# Patient Record
Sex: FEMALE | Race: OTHER
[De-identification: names, ages, dates, MRNs, and addresses within clinical notes are randomized per-mention and may not be internally consistent; named-entity substitution may affect disease eponyms.]

---

## 2017-03-20 ENCOUNTER — HOSPITAL ENCOUNTER (OUTPATIENT)
Dept: HOSPITAL 63 - MAMMO | Age: 42
Discharge: HOME | End: 2017-03-20
Attending: NURSE PRACTITIONER
Payer: COMMERCIAL

## 2017-03-20 DIAGNOSIS — Z12.31: Primary | ICD-10-CM

## 2017-03-20 NOTE — RAD
DATE: 3/20/2017



EXAM: DIGITAL SCREEN BILAT W/CAD



HISTORY: Screening



COMPARISON: Digitized film screening examination from 9/25/2012



This study was interpreted with the benefit of Computerized Aided Detection

(CAD).



FINDINGS:



The breast parenchyma is primarily fatty replaced. Breast parenchyma level

density A.. 



 No mass or suspect calcifications are seen in either breast  







IMPRESSION: Benign finding







BI-RADS CATEGORY: 2 BENIGN FINDING(S)



RECOMMENDED FOLLOW-UP: 12M 12 MONTH FOLLOW-UP



PQRS compliance statement: Patient information was entered into a reminder

system with a target due date 3/20/2018 for the next mammogram.



Mammography is a sensitive method for finding small breast cancers, but it

does not detect them all and is not a substitute for careful clinical

examination.  A negative mammogram does not negate a clinically suspicious

finding and should not result in delay in biopsying a clinically suspicious

abnormality.



"Our facility is accredited by the American [employer] Mammography

Program."

## 2018-05-29 ENCOUNTER — HOSPITAL ENCOUNTER (OUTPATIENT)
Dept: HOSPITAL 63 - US | Age: 43
Discharge: HOME | End: 2018-05-29
Attending: PHYSICIAN ASSISTANT
Payer: COMMERCIAL

## 2018-05-29 DIAGNOSIS — Z12.31: ICD-10-CM

## 2018-05-29 DIAGNOSIS — R10.13: Primary | ICD-10-CM

## 2018-05-29 PROCEDURE — 76705 ECHO EXAM OF ABDOMEN: CPT

## 2018-05-29 PROCEDURE — 77067 SCR MAMMO BI INCL CAD: CPT

## 2018-05-29 NOTE — RAD
LIMITED ABDOMINAL ULTRASOUND

 

History: Intermittent epigastric pain.

 

Comparison: Ultrasound abdomen March 11, 2016.

 

Procedure: Transabdominal ultrasound images are obtained.

 

Findings:

 

Visualized pancreas is unremarkable.  

 

Liver is increased in echogenicity.  No focal hepatic masses are 

identified.  The right hepatic lobe measures 15.2 cm in length.

 

Gallbladder has an unremarkable appearance.  Common bile duct measures 

normally at 5 mm in diameter.

 

Right kidney measures 10.6 cm in length.  There is no evidence of stone or

hydronephrosis.  

 

Visualized IVC demonstrates normal caliber.

 

Impression: 

1.  Echogenic liver, compatible with fatty liver disease.

2.  Otherwise, unremarkable right upper quadrant abdominal ultrasound.

 

 

 

Electronically signed by: Albaro Knowles MD (5/29/2018 2:23 PM) [facility]

## 2018-05-30 NOTE — RAD
DATE: 5/29/2018



EXAM: DIGITAL SCREEN BILAT W/CAD



HISTORY: Routine screening



COMPARISON: 3/20/2017



This study was interpreted with the benefit of Computerized Aided Detection

(CAD).





The breast parenchyma is primarily fatty replaced. Breast parenchyma level

density A.





FINDINGS: No new or enlarging breast densities are seen.  No suspicious

microcalcifications are evident.  





IMPRESSION: Stable mammograms without evidence of malignancy.







BI-RADS CATEGORY: 1 NEGATIVE



RECOMMENDED FOLLOW-UP: 12M 12 MONTH FOLLOW-UP



PQRS compliance statement: Patient information was entered into a reminder

system with a target due date     for the next mammogram.



Mammography is a sensitive method for finding small breast cancers, but it

does not detect them all and is not a substitute for careful clinical

examination.  A negative mammogram does not negate a clinically suspicious

finding and should not result in delay in biopsying a clinically suspicious

abnormality.



"Our facility is accredited by the American [employer] Mammography

Program."

## 2019-02-21 ENCOUNTER — HOSPITAL ENCOUNTER (OUTPATIENT)
Dept: HOSPITAL 63 - PMG | Age: 44
Discharge: HOME | End: 2019-02-21
Attending: PHYSICIAN ASSISTANT
Payer: COMMERCIAL

## 2019-02-21 DIAGNOSIS — M79.621: Primary | ICD-10-CM

## 2019-02-21 DIAGNOSIS — R05: ICD-10-CM

## 2019-02-21 PROCEDURE — 71046 X-RAY EXAM CHEST 2 VIEWS: CPT

## 2019-02-21 PROCEDURE — 73060 X-RAY EXAM OF HUMERUS: CPT

## 2019-02-21 NOTE — RAD
Chest, 2 views, 2/21/2019:

 

HISTORY: Cough

 

The heart size is normal. No pulmonary infiltrate is seen. There is no 

evidence of pleural fluid.

 

IMPRESSION: No acute cardiopulmonary abnormality is detected.

 

Electronically signed by: Rick Moritz, MD (2/21/2019 9:02 AM) [facility]

## 2019-02-21 NOTE — RAD
Right humerus, 2 views, 2/21/2019:

 

HISTORY: Fall, pain

 

No fracture or bony abnormality is detected. The soft tissues are 

unremarkable.

 

IMPRESSION: No significant right humeral abnormality is identified.

 

Electronically signed by: Rick Moritz, MD (2/21/2019 9:02 AM) [facility]

## 2019-11-06 ENCOUNTER — HOSPITAL ENCOUNTER (EMERGENCY)
Dept: HOSPITAL 63 - ER | Age: 44
Discharge: HOME | End: 2019-11-06
Payer: SELF-PAY

## 2019-11-06 VITALS — DIASTOLIC BLOOD PRESSURE: 93 MMHG | SYSTOLIC BLOOD PRESSURE: 140 MMHG

## 2019-11-06 DIAGNOSIS — R11.2: ICD-10-CM

## 2019-11-06 DIAGNOSIS — K59.00: ICD-10-CM

## 2019-11-06 DIAGNOSIS — R10.84: Primary | ICD-10-CM

## 2019-11-06 LAB
ALBUMIN SERPL-MCNC: 3.7 G/DL (ref 3.4–5)
ALBUMIN/GLOB SERPL: 0.9 {RATIO} (ref 1–1.7)
ALP SERPL-CCNC: 45 U/L (ref 46–116)
ALT SERPL-CCNC: 26 U/L (ref 14–59)
ANION GAP SERPL CALC-SCNC: 8 MMOL/L (ref 6–14)
APTT PPP: YELLOW S
AST SERPL-CCNC: 18 U/L (ref 15–37)
BACTERIA #/AREA URNS HPF: (no result) /HPF
BASOPHILS # BLD AUTO: 0.1 X10^3/UL (ref 0–0.2)
BASOPHILS NFR BLD: 2 % (ref 0–3)
BILIRUB SERPL-MCNC: 0.3 MG/DL (ref 0.2–1)
BILIRUB UR QL STRIP: (no result)
BUN/CREAT SERPL: 13 (ref 6–20)
CA-I SERPL ISE-MCNC: 12 MG/DL (ref 7–20)
CALCIUM SERPL-MCNC: 8.9 MG/DL (ref 8.5–10.1)
CHLORIDE SERPL-SCNC: 104 MMOL/L (ref 98–107)
CO2 SERPL-SCNC: 28 MMOL/L (ref 21–32)
CREAT SERPL-MCNC: 0.9 MG/DL (ref 0.6–1)
EOSINOPHIL NFR BLD: 0.1 X10^3/UL (ref 0–0.7)
EOSINOPHIL NFR BLD: 1 % (ref 0–3)
ERYTHROCYTE [DISTWIDTH] IN BLOOD BY AUTOMATED COUNT: 14.2 % (ref 11.5–14.5)
FIBRINOGEN PPP-MCNC: (no result) MG/DL
GFR SERPLBLD BASED ON 1.73 SQ M-ARVRAT: 68.7 ML/MIN
GLOBULIN SER-MCNC: 3.9 G/DL (ref 2.2–3.8)
GLUCOSE SERPL-MCNC: 107 MG/DL (ref 70–99)
GLUCOSE UR STRIP-MCNC: (no result) MG/DL
HCT VFR BLD CALC: 43.1 % (ref 36–47)
HGB BLD-MCNC: 14.4 G/DL (ref 12–15.5)
LIPASE: 244 U/L (ref 73–393)
LYMPHOCYTES # BLD: 1.3 X10^3/UL (ref 1–4.8)
LYMPHOCYTES NFR BLD AUTO: 16 % (ref 24–48)
MCH RBC QN AUTO: 31 PG (ref 25–35)
MCHC RBC AUTO-ENTMCNC: 33 G/DL (ref 31–37)
MCV RBC AUTO: 94 FL (ref 79–100)
MONO #: 0.7 X10^3/UL (ref 0–1.1)
MONOCYTES NFR BLD: 8 % (ref 0–9)
NEUT #: 6.1 X10^3UL (ref 1.8–7.7)
NEUTROPHILS NFR BLD AUTO: 74 % (ref 31–73)
NITRITE UR QL STRIP: (no result)
PLATELET # BLD AUTO: 295 X10^3/UL (ref 140–400)
POTASSIUM SERPL-SCNC: 4.1 MMOL/L (ref 3.5–5.1)
PROT SERPL-MCNC: 7.6 G/DL (ref 6.4–8.2)
RBC # BLD AUTO: 4.61 X10^6/UL (ref 3.5–5.4)
RBC #/AREA URNS HPF: (no result) /HPF (ref 0–2)
SODIUM SERPL-SCNC: 140 MMOL/L (ref 136–145)
SP GR UR STRIP: 1.02
SQUAMOUS #/AREA URNS LPF: (no result) /LPF
UROBILINOGEN UR-MCNC: 0.2 MG/DL
WBC # BLD AUTO: 8.3 X10^3/UL (ref 4–11)
WBC #/AREA URNS HPF: (no result) /HPF (ref 0–4)

## 2019-11-06 PROCEDURE — 36415 COLL VENOUS BLD VENIPUNCTURE: CPT

## 2019-11-06 PROCEDURE — 85025 COMPLETE CBC W/AUTO DIFF WBC: CPT

## 2019-11-06 PROCEDURE — 80053 COMPREHEN METABOLIC PANEL: CPT

## 2019-11-06 PROCEDURE — 81025 URINE PREGNANCY TEST: CPT

## 2019-11-06 PROCEDURE — 74177 CT ABD & PELVIS W/CONTRAST: CPT

## 2019-11-06 PROCEDURE — 87086 URINE CULTURE/COLONY COUNT: CPT

## 2019-11-06 PROCEDURE — 96374 THER/PROPH/DIAG INJ IV PUSH: CPT

## 2019-11-06 PROCEDURE — 81001 URINALYSIS AUTO W/SCOPE: CPT

## 2019-11-06 PROCEDURE — 83690 ASSAY OF LIPASE: CPT

## 2019-11-06 PROCEDURE — 96361 HYDRATE IV INFUSION ADD-ON: CPT

## 2019-11-06 PROCEDURE — 99285 EMERGENCY DEPT VISIT HI MDM: CPT

## 2019-11-06 NOTE — RAD
EXAM: CT ABDOMEN/PELVIS WITH CONTRAST.

 

HISTORY: Abdominal pain.

 

TECHNIQUE: Computed tomography of the abdomen and pelvis was performed 

after the intravenous administration of 75 mL Isovue-370.

 

COMPARISON: None.

 

FINDINGS: Lung windows through the visualized portions of the bases reveal

no abnormality. Bone windows reveal no suspicious lesions. There is a 

small is noted superior endplate of L1.

 

There are multiple small calculi bilaterally in the kidneys, measuring up 

to 4 mm on the left. Small cysts at the right renal lower pole measure up 

to 5 mm. Another on the left measures 7 mm. There are no ureteral calculi.

 

Mild diffuse hepatic steatosis is suspected. The gallbladder, liver, 

adrenal glands, and spleen are unremarkable. There are no pathologically 

enlarged lymph nodes.

 

The uterus and ovaries are unremarkable by CT. The appendix is not 

inflamed. There is no small bowel obstruction. A small umbilical hernia 

contains only fat.

 

IMPRESSION: 

1. No cause for acute pain is identified.

2. Multiple small bilateral renal calculi measure up to 4 mm. No ureteral 

calculi.

3. Mild diffuse hepatic steatosis.

4. Small umbilical hernia containing only fat.

 

 

*One or more of the following individualized dose reduction techniques 

were utilized for this examination:  

1. Automated exposure control.  

2. Adjustment of the mA and/or kV according to patient size.  

3. Use of iterative reconstruction technique.

 

Electronically signed by: ROSITA Adamson MD (11/6/2019 9:23 AM) [facility]

## 2019-11-06 NOTE — PHYS DOC
Past History


Past Medical History:  No Pertinent History


Past Surgical History:  No Surgical History


Alcohol Use:  None


Drug Use:  None





Adult General


Chief Complaint


Chief Complaint:  ABDOMINAL PAIN





HPI


HPI


Patient is a 44-year-old female who presents to the emergency department for 

evaluation. She states she has had generalized abdominal pain, numerous episodes

of vomiting, on and off for the past few months. She has not had any fevers or 

chills. She has had intermittent episodes of constipation and normal bowel 

movements, but has not had any bloody emesis or bloody stools. She denies any 

urinary symptoms, pelvic pain, vaginal bleeding, or discharge. She has not had 

similar symptoms in the past, although she has had at least 1 prior gallbladder 

ultrasound, about a year and a half ago, at this facility, which did not show 

any significant abnormality of her gallbladder. There are no alleviating or 

exacerbating factors to the patient's symptoms.





Review of Systems


Review of Systems





Constitutional: Denies fever or chills []


Eyes: Denies change in visual acuity, redness, or eye pain []


HENT: Denies nasal congestion or sore throat []


Respiratory: Denies cough or shortness of breath []


Cardiovascular:The patient denies any shortness of breath, chest pain, 

palpitations, or orthopnea []


GI:  No additional information not addressed in HPI []


: Denies dysuria or hematuria []


Musculoskeletal: Denies back pain or joint pain []


Integument: Denies rash or skin lesions []


Neurologic: Denies headache, focal weakness or sensory changes []


Endocrine: Denies polyuria or polydipsia []





All other systems were reviewed and found to be within normal limits, except as 

documented in this note.





Physical Exam


Physical Exam


PHYSICAL EXAM:





CONSTITUTIONAL: Well developed, well nourished


HEAD: normocephalic, atraumatic


EENT: PERRL, EOMI. Conjunctivae normal color, sclerae non-icteric; moist mucous 

membranes.


NECK: Supple, non-tender; no meningismus.


LUNGS: Lungs CTA, breathing even and unlabored. Normal air movement.


HEART: Regular rate and rhythm, no murmur


CHEST: No deformity; non-tender


ABDOMEN: The abdomen is soft, there is mild diffuse tenderness to palpation to 

the mid and lower abdomen, without focal tenderness, rebound, or guarding, the 

right upper quadrant, as well as a suprapubic pubic area, are both relatively 

non-tender, no masses or bruits.


EXTREM: Normal ROM; no deformity, no calf tenderness. Normal pulses palpable in 

all extremities. There is no pedal edema.


SKIN: No rash; no diaphoresis


NEURO: Alert; normal speech and cognition; CN's grossly intact; strength grossly

 intact without focal deficit.


BACK: No CVA TTP.





Current Patient Data


Vital Signs





                                   Vital Signs








  Date Time  Temp Pulse Resp B/P (MAP) Pulse Ox O2 Delivery O2 Flow Rate FiO2


 


11/6/19 07:41 98.2 72 16  96 Room Air  








Lab Results





Laboratory Tests








Test


 11/6/19


07:45 11/6/19


07:48 11/6/19


07:53


 


White Blood Count 8.3 x10^3/uL   


 


Red Blood Count 4.61 x10^6/uL   


 


Hemoglobin 14.4 g/dL   


 


Hematocrit 43.1 %   


 


Mean Corpuscular Volume 94 fL   


 


Mean Corpuscular Hemoglobin 31 pg   


 


Mean Corpuscular Hemoglobin


Concent 33 g/dL 


 


 





 


Red Cell Distribution Width 14.2 %   


 


Platelet Count 295 x10^3/uL   


 


Neutrophils (%) (Auto) 74 %   


 


Lymphocytes (%) (Auto) 16 %   


 


Monocytes (%) (Auto) 8 %   


 


Eosinophils (%) (Auto) 1 %   


 


Basophils (%) (Auto) 2 %   


 


Neutrophils # (Auto) 6.1 x10^3uL   


 


Lymphocytes # (Auto) 1.3 x10^3/uL   


 


Monocytes # (Auto) 0.7 x10^3/uL   


 


Eosinophils # (Auto) 0.1 x10^3/uL   


 


Basophils # (Auto) 0.1 x10^3/uL   


 


Sodium Level 140 mmol/L   


 


Potassium Level 4.1 mmol/L   


 


Chloride Level 104 mmol/L   


 


Carbon Dioxide Level 28 mmol/L   


 


Anion Gap 8   


 


Blood Urea Nitrogen 12 mg/dL   


 


Creatinine 0.9 mg/dL   


 


Estimated GFR


(Cockcroft-Gault) 68.7 


 


 





 


BUN/Creatinine Ratio 13   


 


Glucose Level 107 mg/dL   


 


Calcium Level 8.9 mg/dL   


 


Total Bilirubin 0.3 mg/dL   


 


Aspartate Amino Transf


(AST/SGOT) 18 U/L 


 


 





 


Alanine Aminotransferase


(ALT/SGPT) 26 U/L 


 


 





 


Alkaline Phosphatase 45 U/L   


 


Total Protein 7.6 g/dL   


 


Albumin 3.7 g/dL   


 


Albumin/Globulin Ratio 0.9   


 


Lipase 244 U/L   


 


Urine Collection Type  Unknown  


 


Urine Color  Yellow  


 


Urine Clarity  Cloudy  


 


Urine pH  7.0  


 


Urine Specific Gravity  1.020  


 


Urine Protein  Neg  


 


Urine Glucose (UA)  Neg mg/dL  


 


Urine Ketones (Stick)  Neg mg/dL  


 


Urine Blood  Trace  


 


Urine Nitrite  Neg  


 


Urine Bilirubin  Neg  


 


Urine Urobilinogen Dipstick  0.2 mg/dL  


 


Urine Leukocyte Esterase  Neg  


 


Urine RBC  3-5 /HPF  


 


Urine WBC  1-4 /HPF  


 


Urine Squamous Epithelial


Cells 


 Many /LPF 


 





 


Urine Bacteria  Mod /HPF  


 


Urine Mucus  Slight /LPF  


 


Bedside Urine HCG, Qualitative   hcg negative 








Current Medications








 Medications


  (Trade)  Dose


 Ordered  Sig/Lala


 Route


 PRN Reason  Start Time


 Stop Time Status Last Admin


Dose Admin


 


 Sodium Chloride  1,000 ml @ 


 1,000 mls/hr  Q1H


 IV


   11/6/19 07:43


 11/6/19 08:42  11/6/19 08:04





 


 Ondansetron HCl


  (Zofran)  4 mg  1X  ONCE


 IVP


   11/6/19 08:10


 11/6/19 08:11 DC 11/6/19 08:04





 


 Acetaminophen


  (Tylenol)  1,000 mg  1X  ONCE


 PO


   11/6/19 07:45


 11/6/19 08:02 DC 11/6/19 08:04





 


 Iohexol


  (Omnipaque 300


 Mg/ml)  75 ml  1X  ONCE


 IV


   11/6/19 08:15


 11/6/19 08:16 DC 11/6/19 08:08














EKG


EKG


[]





Radiology/Procedures


Radiology/Procedures


PROCEDURE: CT ABD PELV W/ IV CONTRST ONLY





EXAM: CT ABDOMEN/PELVIS WITH CONTRAST.


 


HISTORY: Abdominal pain.


 


TECHNIQUE: Computed tomography of the abdomen and pelvis was performed 


after the intravenous administration of 75 mL Isovue-370.


 


COMPARISON: None.


 


FINDINGS: Lung windows through the visualized portions of the bases reveal


no abnormality. Bone windows reveal no suspicious lesions. There is a 


small is noted superior endplate of L1.


 


There are multiple small calculi bilaterally in the kidneys, measuring up 


to 4 mm on the left. Small cysts at the right renal lower pole measure up 


to 5 mm. Another on the left measures 7 mm. There are no ureteral calculi.


 


Mild diffuse hepatic steatosis is suspected. The gallbladder, liver, 


adrenal glands, and spleen are unremarkable. There are no pathologically 


enlarged lymph nodes.


 


The uterus and ovaries are unremarkable by CT. The appendix is not 


inflamed. There is no small bowel obstruction. A small umbilical hernia 


contains only fat.


 


IMPRESSION: 


1. No cause for acute pain is identified.


2. Multiple small bilateral renal calculi measure up to 4 mm. No ureteral 


calculi.


3. Mild diffuse hepatic steatosis.


4. Small umbilical hernia containing only fat.[]





Course & Med Decision Making


Course & Med Decision Making


Pertinent Labs and Imaging studies reviewed. (See chart for details)





[]Discussed test results with the patient, the need for further outpatient 

evaluation and GI follow-up, and return precautions in detail.





Dragon Disclaimer


Dragon Disclaimer


This electronic medical record was generated, in whole or in part, using a voice

 recognition dictation system.





Departure


Departure:


Impression:  


   Primary Impression:  


   Abdominal pain


   Additional Impression:  


   Nausea & vomiting


Disposition:  01 HOME, SELF-CARE


Condition:  STABLE


Referrals:  


SHELTON GARRETT (PCP)


Patient Instructions:  Abdominal Pain (Nonspecific), Nausea and Vomiting


Scripts


Ondansetron (ONDANSETRON ODT) 4 Mg Tab.rapdis


1 TAB PO PRN Q6-8HRS PRN for NAUSEA, #15 TAB


   Prov: KEVIN ROLLINS MD         11/6/19





Problem Qualifiers











KEVIN ROLLINS MD            Nov 6, 2019 07:48

## 2020-07-13 ENCOUNTER — HOSPITAL ENCOUNTER (EMERGENCY)
Dept: HOSPITAL 63 - ER | Age: 45
Discharge: HOME | End: 2020-07-13
Payer: SELF-PAY

## 2020-07-13 VITALS — HEIGHT: 65 IN | BODY MASS INDEX: 29.2 KG/M2 | WEIGHT: 175.27 LBS

## 2020-07-13 VITALS
SYSTOLIC BLOOD PRESSURE: 140 MMHG | SYSTOLIC BLOOD PRESSURE: 140 MMHG | DIASTOLIC BLOOD PRESSURE: 81 MMHG | SYSTOLIC BLOOD PRESSURE: 140 MMHG | DIASTOLIC BLOOD PRESSURE: 81 MMHG | DIASTOLIC BLOOD PRESSURE: 81 MMHG

## 2020-07-13 DIAGNOSIS — R42: ICD-10-CM

## 2020-07-13 DIAGNOSIS — R11.2: ICD-10-CM

## 2020-07-13 DIAGNOSIS — G44.201: Primary | ICD-10-CM

## 2020-07-13 DIAGNOSIS — Z79.899: ICD-10-CM

## 2020-07-13 LAB
ALBUMIN SERPL-MCNC: 3.6 G/DL (ref 3.4–5)
ALBUMIN/GLOB SERPL: 0.9 {RATIO} (ref 1–1.7)
ALP SERPL-CCNC: 48 U/L (ref 46–116)
ALT SERPL-CCNC: 30 U/L (ref 14–59)
ANION GAP SERPL CALC-SCNC: 7 MMOL/L (ref 6–14)
AST SERPL-CCNC: 18 U/L (ref 15–37)
BASOPHILS # BLD AUTO: 0.1 X10^3/UL (ref 0–0.2)
BASOPHILS NFR BLD: 1 % (ref 0–3)
BILIRUB SERPL-MCNC: 0.3 MG/DL (ref 0.2–1)
BUN/CREAT SERPL: 13 (ref 6–20)
CA-I SERPL ISE-MCNC: 17 MG/DL (ref 7–20)
CALCIUM SERPL-MCNC: 8.8 MG/DL (ref 8.5–10.1)
CHLORIDE SERPL-SCNC: 103 MMOL/L (ref 98–107)
CO2 SERPL-SCNC: 29 MMOL/L (ref 21–32)
CREAT SERPL-MCNC: 1.3 MG/DL (ref 0.6–1)
CRP SERPL-MCNC: 4.1 MG/L (ref 0–3.3)
EOSINOPHIL NFR BLD: 0.1 X10^3/UL (ref 0–0.7)
EOSINOPHIL NFR BLD: 1 % (ref 0–3)
ERYTHROCYTE [DISTWIDTH] IN BLOOD BY AUTOMATED COUNT: 14.1 % (ref 11.5–14.5)
GFR SERPLBLD BASED ON 1.73 SQ M-ARVRAT: 44.7 ML/MIN
GLOBULIN SER-MCNC: 4 G/DL (ref 2.2–3.8)
GLUCOSE SERPL-MCNC: 87 MG/DL (ref 70–99)
HCT VFR BLD CALC: 42.7 % (ref 36–47)
HGB BLD-MCNC: 14.4 G/DL (ref 12–15.5)
LYMPHOCYTES # BLD: 1.8 X10^3/UL (ref 1–4.8)
LYMPHOCYTES NFR BLD AUTO: 22 % (ref 24–48)
MCH RBC QN AUTO: 31 PG (ref 25–35)
MCHC RBC AUTO-ENTMCNC: 34 G/DL (ref 31–37)
MCV RBC AUTO: 93 FL (ref 79–100)
MONO #: 0.6 X10^3/UL (ref 0–1.1)
MONOCYTES NFR BLD: 8 % (ref 0–9)
NEUT #: 5.5 X10^3UL (ref 1.8–7.7)
NEUTROPHILS NFR BLD AUTO: 67 % (ref 31–73)
PLATELET # BLD AUTO: 300 X10^3/UL (ref 140–400)
POTASSIUM SERPL-SCNC: 3.8 MMOL/L (ref 3.5–5.1)
PROT SERPL-MCNC: 7.6 G/DL (ref 6.4–8.2)
RBC # BLD AUTO: 4.61 X10^6/UL (ref 3.5–5.4)
SODIUM SERPL-SCNC: 139 MMOL/L (ref 136–145)
WBC # BLD AUTO: 8.1 X10^3/UL (ref 4–11)

## 2020-07-13 PROCEDURE — 80053 COMPREHEN METABOLIC PANEL: CPT

## 2020-07-13 PROCEDURE — 96374 THER/PROPH/DIAG INJ IV PUSH: CPT

## 2020-07-13 PROCEDURE — 96361 HYDRATE IV INFUSION ADD-ON: CPT

## 2020-07-13 PROCEDURE — 96375 TX/PRO/DX INJ NEW DRUG ADDON: CPT

## 2020-07-13 PROCEDURE — 86140 C-REACTIVE PROTEIN: CPT

## 2020-07-13 PROCEDURE — 85025 COMPLETE CBC W/AUTO DIFF WBC: CPT

## 2020-07-13 PROCEDURE — 99285 EMERGENCY DEPT VISIT HI MDM: CPT

## 2020-07-13 PROCEDURE — 70450 CT HEAD/BRAIN W/O DYE: CPT

## 2020-07-13 PROCEDURE — 36415 COLL VENOUS BLD VENIPUNCTURE: CPT

## 2020-07-13 NOTE — RAD
CT HEAD WO CONTRAST

 

History:Right-sided headache

 

Comparison: None.

 

Technique: Noncontrast CT imaging was performed of the head.  Exposure: 

One or more of the following individualized dose reduction techniques were

utilized for this examination:  1. Automated exposure control  2. 

Adjustment of the mA and/or kV according to patient size  3. Use of 

iterative reconstruction technique.

 

Findings: No acute extra-axial or parenchymal hemorrhage is identified. 

There is no significant intra-axial mass effect, midline shift, or 

extra-axial fluid collection. The gray-white differentiation of the major 

vascular territories is preserved.  The ventricles, sulci, and cisterns 

are within normal limits in size and configuration.   The mastoid air 

cells and the visualized paranasal sinuses are aerated.  No acute 

calvarial abnormality is identified.

 

Impression:

1. No acute intracranial abnormality is identified.  

 

Electronically signed by: Ryley Villavicencio MD (7/13/2020 2:07 PM) [id number]

## 2020-07-13 NOTE — PHYS DOC
Past History


Past Medical History:  No Pertinent History


Past Surgical History:  No Surgical History


Smoking:  Non-smoker


Alcohol Use:  None


Drug Use:  None





General Adult


EDM:


Chief Complaint:  HEADACHE





HPI:


HPI:





Patient is a 45 year old female who presents for evaluation of headache that has

been progressing for 3 days.  It is worse on the right side of her head.  

Patient denies any vision changes, although she does have light sensitivity.  

She has a history of recurring headaches but they are not usually this bad.  

Patient had an episode of vomiting today.  Patient denies any neck stiffness.  

Patient has a prior history of seizures in the distant past at age 15.  She has 

not had a recent seizure and she is not on seizure medication.  Patient is 

benign and nontoxic appearing.  Patient has no focal deficits or lateralizing 

signs





Review of Systems:


Review of Systems:


Constitutional:  Denies fever or chills 


Eyes:  Denies change in visual acuity 


HENT:  Denies nasal congestion or sore throat 


Respiratory:  Denies cough or shortness of breath 


Cardiovascular:  Denies chest pain or edema 


GI:  Denies abdominal pain, had nausea and vomiting, no bloody stools or 

diarrhea 


: Denies dysuria 


Musculoskeletal:  Denies back pain or joint pain 


Integument:  Denies rash 


Neurologic:  has headache, no focal weakness or sensory changes 


Endocrine:  Denies polyuria or polydipsia 


Lymphatic:  Denies swollen glands 


Psychiatric:  Denies depression or anxiety





Heart Score:


Risk Factors:


Risk Factors:  DM, Current or recent (<one month) smoker, HTN, HLP, family 

history of CAD, obesity.


Risk Scores:


Score 0 - 3:  2.5% MACE over next 6 weeks - Discharge Home


Score 4 - 6:  20.3% MACE over next 6 weeks - Admit for Clinical Observation


Score 7 - 10:  72.7% MACE over next 6 weeks - Early Invasive Strategies





Current Medications:


Current Meds:





Current Medications








 Medications


  (Trade)  Dose


 Ordered  Sig/Lala  Start Time


 Stop Time Status Last Admin


Dose Admin


 


 Diphenhydramine


 HCl


  (Benadryl)  25 mg  1X  ONCE  20 13:30


 20 13:32 DC 20 13:56


25 MG


 


 Ondansetron HCl


  (Zofran)  4 mg  1X  ONCE  20 13:30


 20 13:32 DC 20 13:56


4 MG


 


 Sodium Chloride  1,000 ml @ 


 1,000 mls/hr  1X  ONCE  20 13:30


 20 14:29  20 13:55


1,000 MLS/HR











Allergies:


Allergies:





Allergies








Coded Allergies Type Severity Reaction Last Updated Verified


 


  No Known Drug Allergies    19 No











Physical Exam:


PE:





Constitutional: Well developed, well nourished, mild acute distress, non-toxic 

appearance. []


HENT: Normocephalic, atraumatic, bilateral external ears normal, oropharynx 

moist, no oral exudates, nose normal. []


Eyes: PERRL, EOMI, conjunctiva normal, no discharge. [] 


Neck: Normal range of motion, no tenderness, supple, no stridor. [] 


Cardiovascular:Heart rate regular rhythm, no murmur []


Lungs & Thorax:  Bilateral breath sounds clear to auscultation []


Abdomen: Bowel sounds normal, soft, no tenderness, no masses. [] 


Skin: Warm, dry, no erythema, no rash. [] 


Back: No tenderness. [] 


Extremities: No tenderness, no cyanosis, ROM intact, no edema. [] 


Neurologic: Alert and oriented X 3, normal motor function, normal sensory 

function, no focal deficits noted. []


Psychologic: Affect normal, judgement normal, mood normal. []





Current Patient Data:


Labs:





Laboratory Tests








Test


 20


13:39


 


White Blood Count 8.1 x10^3/uL 


 


Red Blood Count 4.61 x10^6/uL 


 


Hemoglobin 14.4 g/dL 


 


Hematocrit 42.7 % 


 


Mean Corpuscular Volume 93 fL 


 


Mean Corpuscular Hemoglobin 31 pg 


 


Mean Corpuscular Hemoglobin


Concent 34 g/dL 





 


Red Cell Distribution Width 14.1 % 


 


Platelet Count 300 x10^3/uL 


 


Neutrophils (%) (Auto) 67 % 


 


Lymphocytes (%) (Auto) 22 % 


 


Monocytes (%) (Auto) 8 % 


 


Eosinophils (%) (Auto) 1 % 


 


Basophils (%) (Auto) 1 % 


 


Neutrophils # (Auto) 5.5 x10^3uL 


 


Lymphocytes # (Auto) 1.8 x10^3/uL 


 


Monocytes # (Auto) 0.6 x10^3/uL 


 


Eosinophils # (Auto) 0.1 x10^3/uL 


 


Basophils # (Auto) 0.1 x10^3/uL 


 


Sodium Level 139 mmol/L 


 


Potassium Level 3.8 mmol/L 


 


Chloride Level 103 mmol/L 


 


Carbon Dioxide Level 29 mmol/L 


 


Anion Gap 7 


 


Blood Urea Nitrogen 17 mg/dL 


 


Creatinine 1.3 mg/dL 


 


Estimated GFR


(Cockcroft-Gault) 44.7 





 


BUN/Creatinine Ratio 13 


 


Glucose Level 87 mg/dL 


 


Calcium Level 8.8 mg/dL 


 


Total Bilirubin 0.3 mg/dL 


 


Aspartate Amino Transf


(AST/SGOT) 18 U/L 





 


Alanine Aminotransferase


(ALT/SGPT) 30 U/L 





 


Alkaline Phosphatase 48 U/L 


 


C-Reactive Protein 4.1 mg/L 


 


Total Protein 7.6 g/dL 


 


Albumin 3.6 g/dL 


 


Albumin/Globulin Ratio 0.9 








Current Medications








 Medications


  (Trade)  Dose


 Ordered  Sig/Lala


 Route


 PRN Reason  Start Time


 Stop Time Status Last Admin


Dose Admin


 


 Sodium Chloride  1,000 ml @ 


 1,000 mls/hr  1X  ONCE


 IV


   20 13:30


 20 14:29 DC 20 13:55





 


 Ondansetron HCl


  (Zofran)  4 mg  1X  ONCE


 IVP


   20 13:30


 20 13:32 DC 20 13:56





 


 Diphenhydramine


 HCl


  (Benadryl)  25 mg  1X  ONCE


 IVP


   20 13:30


 20 13:32 DC 20 13:56





 


 Ketorolac


 Tromethamine


  (Toradol 15mg


 Vial)  15 mg  1X  ONCE


 IVP


   20 14:30


 20 14:31 DC 20 14:25











Vital Signs:





                                   Vital Signs








  Date Time  Temp Pulse Resp B/P (MAP) Pulse Ox O2 Delivery O2 Flow Rate FiO2


 


20 13:08 97.7 95 16 133/71 (91) 95 Room Air  











EKG:


EKG:


[]





Radiology/Procedures:


Radiology/Procedures:


[facility] [address], [place] [address] [phone]


                                        


                                 IMAGING REPORT





                                     Signed





PATIENT: COOPER ASTORGA DACCOUNT: [id number]     MRN#: [id number]


: 1975           LOCATION: ER              AGE: 45


SEX: F                    EXAM DT: 20         ACCESSION#: [id number]


STATUS: PRE ER            ORD. PHYSICIAN: MATT KOLB DO


REASON: right sided headache


PROCEDURE: CT HEAD WO CONTRAST





CT HEAD WO CONTRAST


 


History:Right-sided headache


 


Comparison: None.


 


Technique: Noncontrast CT imaging was performed of the head.  Exposure: 


One or more of the following individualized dose reduction techniques were


utilized for this examination:  1. Automated exposure control  2. 


Adjustment of the mA and/or kV according to patient size  3. Use of 


iterative reconstruction technique.


 


Findings: No acute extra-axial or parenchymal hemorrhage is identified. 


There is no significant intra-axial mass effect, midline shift, or 


extra-axial fluid collection. The gray-white differentiation of the major 


vascular territories is preserved.  The ventricles, sulci, and cisterns 


are within normal limits in size and configuration.   The mastoid air 


cells and the visualized paranasal sinuses are aerated.  No acute 


calvarial abnormality is identified.


 


Impression:


1. No acute intracranial abnormality is identified.  


 


Electronically signed by: Danielito Chauhan MD (2020 2:07 PM) [id number]














DICTATED AND SIGNED BY:     DANIELITO CHAUHAN MD


DATE:     20 1409





CC: SHELTON GARRETT; MATT KOLB DO ~


[]





Course & Med Decision Making:


Course & Med Decision Making


Pertinent Labs and Imaging studies reviewed. (See chart for details)





[]





Dragon Disclaimer:


Dragon Disclaimer:


This electronic medical record was generated, in whole or in part, using a voice

 recognition dictation system.





1450 stable, feeling much better at this time.  Patient is clear this is not the

 worst headache of her life.  She currently rates her pain as 3 out of 10.  

Prescription for Phenergan given.  Patient has no focal deficits or lateralizing

 signs.  She does have a steady gait.  CT head and blood work were unremarkable 

with exception of a slightly elevated CRP.  We are not able to run a sed rate at

 this time.





Departure


Departure:


Impression:  


   Primary Impression:  


   Acute headache


   Qualified Codes:  G44.201 - Tension-type headache, unspecified, intractable


   Additional Impression:  


   Nausea & vomiting


Disposition:  01 HOME/RESIDENCE PRIOR TO ADM


Condition:  STABLE


Referrals:  


SHELTON GARRETT (PCP)


Patient Instructions:  General Headache Without Cause, Nausea and Vomiting





Additional Instructions:  


Drink plenty fluids, rest, take medication as directed, no dangerous cause of 

your headache found.  Your CAT scan of head and blood work unremarkable


Scripts


Promethazine Hcl (PROMETHAZINE HCL) 25 Mg Tablet


1 TAB PO PRN Q6HRS for nausea and vomiting, #20 TAB


   Prov: MATT KOLB DO         20





Justification of Admission:


Justification of Admission:


Justification of Admission Dx:  N/A











MATT KOLB DO              2020 14:17

## 2020-08-28 ENCOUNTER — HOSPITAL ENCOUNTER (OUTPATIENT)
Dept: HOSPITAL 63 - US | Age: 45
Discharge: HOME | End: 2020-08-28
Attending: PHYSICIAN ASSISTANT
Payer: COMMERCIAL

## 2020-08-28 DIAGNOSIS — K76.0: Primary | ICD-10-CM

## 2020-08-28 DIAGNOSIS — N20.0: ICD-10-CM

## 2020-08-28 DIAGNOSIS — K76.89: ICD-10-CM

## 2020-08-28 PROCEDURE — 76705 ECHO EXAM OF ABDOMEN: CPT

## 2020-08-28 NOTE — RAD
INDICATION : Reason: RUQ ABD PAIN, NAUSEA / Spl. Instructions:  / History:

 

 

COMPARISON: November 2019

 

TECHNIQUE: Multiple ultrasound images obtained through the abdomen in 

grayscale and color.

 

FINDINGS:

 

Liver: Echogenic. Portions of liver not well seen secondary to overlying 

structures obscuring.

Gallbladder: No wall thickening or stones.

IVC: Partially distended at level of liver.

Common Bile Duct: Not dilated.

Pancreas: No gross abnormality identified in visualized portions of 

pancreas.

Right Kidney:  No hydronephrosis. There are some echogenic foci within 

measuring up to 4 mm.

 

IMPRESSION:

 

*   Liver is echogenic. Nonspecific but can be seen with fatty 

infiltration.

 

*  There are some echogenic foci at the right kidney. Causes such as 

nonobstructive renal stone could have this appearance.

 

Electronically signed by: Chepe Fung MD (8/28/2020 8:39 AM) [id number]

## 2020-12-07 ENCOUNTER — HOSPITAL ENCOUNTER (OUTPATIENT)
Dept: HOSPITAL 63 - NM | Age: 45
End: 2020-12-07
Payer: COMMERCIAL

## 2020-12-07 VITALS — WEIGHT: 170 LBS | BODY MASS INDEX: 28.32 KG/M2 | HEIGHT: 65 IN

## 2020-12-07 DIAGNOSIS — K81.1: Primary | ICD-10-CM

## 2020-12-07 PROCEDURE — 78227 HEPATOBIL SYST IMAGE W/DRUG: CPT

## 2020-12-07 PROCEDURE — A9537 TC99M MEBROFENIN: HCPCS

## 2020-12-07 NOTE — RAD
Examination: NM HEPATOBILIARY SCAN W PHARM

 

History: Reason: right upper quandrant pain, nausea and vomiting for 2 

years / Spl. Instructions:  / History: 

 

Comparison/Correlation: 8/28/2020 Limited abdominal ultrasound

 

Findings: 5 mCi technetium 99m Choletec was intravenously administered for

purposes of thyroid ablation therapy. Relatively greater intensity of 

radiotracer involving the left hepatic lobe distribution is present but 

this is probably artifactual and related to attenuation by soft tissues of

the right hepatic dome region. No biliary dilatation. Gallbladder is 

visualized at 35 minutes. Radiotracer is present within small bowel at 15 

to 20 minutes.

 

After 60 minutes, 1.54 mcg CCK was administered over the course of 30 

minutes and imaging was performed for an additional 60 minutes. 

Gallbladder ejection fraction of 1 percent is identified.

 

 

Impression:

Very low gallbladder ejection fraction compatible with chronic 

cholecystitis in the appropriate clinical setting. No evidence of acute 

cholecystitis or biliary dilatation.

 

Electronically signed by: Angel Rodriguez MD (12/7/2020 10:51 AM) [id number]

## 2021-02-16 ENCOUNTER — HOSPITAL ENCOUNTER (OUTPATIENT)
Dept: HOSPITAL 61 - LAB | Age: 46
End: 2021-02-16
Attending: SURGERY
Payer: COMMERCIAL

## 2021-02-16 DIAGNOSIS — Z20.822: ICD-10-CM

## 2021-02-16 DIAGNOSIS — Z01.812: Primary | ICD-10-CM

## 2021-02-16 DIAGNOSIS — K82.8: ICD-10-CM

## 2021-02-16 PROCEDURE — U0003 INFECTIOUS AGENT DETECTION BY NUCLEIC ACID (DNA OR RNA); SEVERE ACUTE RESPIRATORY SYNDROME CORONAVIRUS 2 (SARS-COV-2) (CORONAVIRUS DISEASE [COVID-19]), AMPLIFIED PROBE TECHNIQUE, MAKING USE OF HIGH THROUGHPUT TECHNOLOGIES AS DESCRIBED BY CMS-2020-01-R: HCPCS

## 2021-02-19 ENCOUNTER — HOSPITAL ENCOUNTER (OUTPATIENT)
Dept: HOSPITAL 61 - SURG | Age: 46
Discharge: HOME | End: 2021-02-19
Attending: SURGERY
Payer: COMMERCIAL

## 2021-02-19 VITALS — WEIGHT: 220 LBS | HEIGHT: 65 IN | BODY MASS INDEX: 36.65 KG/M2

## 2021-02-19 VITALS — SYSTOLIC BLOOD PRESSURE: 133 MMHG | DIASTOLIC BLOOD PRESSURE: 72 MMHG

## 2021-02-19 DIAGNOSIS — K82.8: Primary | ICD-10-CM

## 2021-02-19 DIAGNOSIS — Z98.890: ICD-10-CM

## 2021-02-19 DIAGNOSIS — Z79.899: ICD-10-CM

## 2021-02-19 DIAGNOSIS — K81.1: ICD-10-CM

## 2021-02-19 DIAGNOSIS — E66.9: ICD-10-CM

## 2021-02-19 DIAGNOSIS — J45.909: ICD-10-CM

## 2021-02-19 PROCEDURE — 81025 URINE PREGNANCY TEST: CPT

## 2021-02-19 PROCEDURE — 47562 LAPAROSCOPIC CHOLECYSTECTOMY: CPT

## 2021-02-19 RX ADMIN — FENTANYL CITRATE PRN MCG: 50 INJECTION INTRAMUSCULAR; INTRAVENOUS at 09:00

## 2021-02-19 RX ADMIN — FENTANYL CITRATE PRN MCG: 50 INJECTION INTRAMUSCULAR; INTRAVENOUS at 09:05

## 2021-02-19 RX ADMIN — FENTANYL CITRATE PRN MCG: 50 INJECTION INTRAMUSCULAR; INTRAVENOUS at 09:35

## 2021-02-19 NOTE — PDOC4
Operative Note


Operative Note


Date: February 19 of 2021 at 834


Preoperative diagnosis: Biliary dyskinesia


Postoperative diagnosis: Same


Procedure: Laparoscopic cholecystectomy


Surgeon: Josr


Specimen: Gallbladder


Dictation: Patient is a 46-year-old female who is been complaining of right 

upper quadrant abdominal pain.  A HIDA scan showed ejection fraction of only 1% 

with recurrence of her pain.  Procedure of laparoscopic cholecystectomy was 

explained to the patient detail risk benefits were also discussed including 

bleeding infection injury to intra-abdominal contents possibly necessitating 

further or open operations.  Alternatives to this procedure also discussed with 

patient who seemed to understand and gave both verbal and written consent to 

have the procedure performed.  Patient was taken to the operating room placed in

the supine position general anesthesia was initiated once patient was sleeping 

intubated her abdomen was prepped and draped usual sterile fashion using Chl

oraPrep.  Area just below the umbilicus was injected with quarter percent 

Marcaine with epinephrine incision was made 11 blade scalpel and a varies needle

was placed within the abdomen creating pneumoperitoneum once this was complete 

the millimeter port was placed and a 5 mm camera is placed within the abdomen 

which was inspected no other abnormalities were noted.  5 mm port was placed in 

the epigastrium a 5 mm port was placed in the right midabdomen and a 5 mm port 

was placed in the right lateral abdomen.  The dome of the gallbladder is grasped

retracted cephalad the infundibulum of the gallbladder is grasped tract and 

laterally exposing the triangle adherent tissues the triangle were taken down 

exposing the cystic duct and cystic artery both were doubly clipped and 

transected the gallbladder was taken off the liver with hook electrocautery 

placed in Endo Catch bag moving the umbilicus the right upper quadrant is 

irrigated and suctioned dry hemostasis deemed be appropriate the 

pneumoperitoneum was reduced all ports removed the fascial defect at the 

umbilicus was closed with a figure-of-eight 0 Vicryl suture and the skin was 

reapproximated all port sites for subcuticular Monocryl Mastisol Steri-Strips 

and island dressings were applied.  Patient was awakened and extubated in the 

operating room taken to recovery in stable condition all sponge instrument 

needle counts listed as correct estimated blood loss 10 mL.











VALERIE HAUSER MD             Feb 19, 2021 08:37

## 2021-02-19 NOTE — PDOC1
History and Physical


Date of Admission


Date of Admission


DATE: 2/19/21 


TIME: 07:47





Identification/Chief Complaint


Chief Complaint


Right upper quadrant abdominal pain





Source


Source:  Patient





History of Present Illness


History of Present Illness


46-year-old female with several month history of right upper quadrant abdominal 

pain radiating to her back and shoulder worse after eating.  She has had some 

nausea no vomiting ultrasound did not show any gallstones but HIDA scan showed 

ejection fraction of 1%





Past Medical History


Cardiovascular:  No pertinent hx


Pulmonary:  No pertinent hx


GI:  No pertinent hx


Heme/Onc:  No pertinent hx


Hepatobiliary:  No pertinent hx


Psych:  No pertinent hx


Rheumatologic:  No pertinent hx


Infectious disease:  No pertinent hx


ENT:  No pertinent hx


Renal/:  No pertinent hx


Endocrine:  No pertinent hx


Dermatology:  No pertinent hx





Past Surgical History


Past Surgical History:  Other (D&C)





Family History


Family History:  No Significant





Social History


Smoke:  No


ALCOHOL:  none


Drugs:  None





Current Medications


Current Medications





Current Medications


Cefazolin Sodium/ Dextrose 50 ml @  100 mls/hr 1X PREOP  PRN IV PRIOR TO 

PROCEDURE;  Start 2/19/21 at 06:00;  Stop 2/19/21 at 18:00


Acetaminophen (Tylenol) 1,000 mg 1X  ONCE PO  Last administered on 2/19/21at 

06:36;  Start 2/19/21 at 06:30;  Stop 2/19/21 at 06:31;  Status DC


Ringer's Solution 1,000 ml @  100 mls/hr Q10H IV  Last administered on 2/19/21at

06:35;  Start 2/19/21 at 06:30


Fentanyl Citrate (Fentanyl 2ml Vial) 25 mcg PRN Q5MIN  PRN IVP MILD PAIN 1-3;  

Start 2/19/21 at 07:00;  Stop 2/20/21 at 06:59


Fentanyl Citrate (Fentanyl 2ml Vial) 50 mcg PRN Q5MIN  PRN IVP MODERATE PAIN 4-

6;  Start 2/19/21 at 07:00;  Stop 2/20/21 at 06:59


Morphine Sulfate (Morphine Sulfate) 1 mg PRN Q10MIN  PRN IVP SEVERE PAIN 7-10;  

Start 2/19/21 at 07:00;  Stop 2/20/21 at 06:59


Ringer's Solution 1,000 ml @  30 mls/hr Q24H IV ;  Start 2/19/21 at 07:00;  Stop

2/19/21 at 18:59


Hydromorphone HCl (Dilaudid) 0.5 mg PRN Q10MIN  PRN IVP SEVERE PAIN 7-10, 2nd 

CHOICE;  Start 2/19/21 at 07:00;  Stop 2/20/21 at 06:59


Prochlorperazine Edisylate (Compazine) 5 mg PACU PRN  PRN IVP NAUSEA, MRX1;  

Start 2/19/21 at 07:00;  Stop 2/20/21 at 06:59


Bupivacaine HCl/ Epinephrine Bitart (Sensorcain-Epi 0.25% Kit) 30 ml STK-MED 

ONCE .ROUTE ;  Start 2/19/21 at 07:19;  Stop 2/19/21 at 07:19;  Status DC


Iohexol (Omnipaque 300 Mg/ml) 50 ml STK-MED ONCE .ROUTE ;  Start 2/19/21 at 

07:19;  Stop 2/19/21 at 07:19;  Status DC


Cellulose (Surgicel Hemostat 4x8) 1 each STK-MED ONCE .ROUTE ;  Start 2/19/21 at

07:19;  Stop 2/19/21 at 07:19;  Status DC


Rocuronium Bromide (Zemuron) 50 mg STK-MED ONCE .ROUTE ;  Start 2/19/21 at 

07:32;  Stop 2/19/21 at 07:32;  Status DC


Midazolam HCl (Versed) 2 mg STK-MED ONCE .ROUTE ;  Start 2/19/21 at 07:32;  Stop

2/19/21 at 07:32;  Status DC


Fentanyl Citrate (Fentanyl 2ml Vial) 100 mcg STK-MED ONCE .ROUTE ;  Start 

2/19/21 at 07:33;  Stop 2/19/21 at 07:33;  Status DC





Active Scripts


Active


Reported


No Known Medications Prior To Admisstion (Info)  Each 1 Each MC 1X





Allergies


Allergies:  


Coded Allergies:  


     No Known Drug Allergies (Unverified , 2/19/21)





ROS


Gastrointestinal:  Yes Nausea, Yes Abdominal Pain





Physical Exam


General:  Alert, Oriented X3, Cooperative, No acute distress


HEENT:  Atraumatic, EOMI


Lungs:  Clear to auscultation, Normal air movement


Heart:  RRR, no gallops


Abdomen:  Normal bowel sounds, Soft, Other (Mildly tender right upper quadrant)


Rectal Exam:  not examined


Extremities:  No edema


Skin:  No significant lesion


Neuro:  Normal speech





Vitals


Vitals





Vital Signs








  Date Time  Temp Pulse Resp B/P (MAP) Pulse Ox O2 Delivery O2 Flow Rate FiO2


 


2/19/21 06:32 97.6 72 18  96   





 97.6       


 


2/19/21 06:29    104/63  Room Air  











Labs


Labs





Laboratory Tests








Test


 2/19/21


06:23


 


Bedside Urine HCG, Qualitative


 Hcg negative


(Negative)








Laboratory Tests








Test


 2/19/21


06:23


 


Bedside Urine HCG, Qualitative


 Hcg negative


(Negative)











VTE Prophylaxis Ordered


VTE Prophylaxis Devices:  Yes


VTE Pharmacological Prophylaxi:  Contraindicated





Assessment/Plan


Assessment/Plan


Right upper quadrant abdominal pain abnormal HIDA scan biliary dyskinesia plan 

laparoscopic cholecystectomy





Justifications for Admission


Other Justification














VALERIE HAUSER MD             Feb 19, 2021 07:50

## 2021-02-19 NOTE — DISCH
DISCHARGE INSTRUCTIONS


Condition on Discharge


Condition on Discharge:  Stable





Activity After Discharge


Activity Instructions for Disc:  Avoid exertion


Other activity instructions:  No lifting more than 20 pounds for 2 weeks





Diet after Discharge


Diet after Discharge:  Low Fat





Wound Incision Care


Other wound/incision instructi:  May shower in 24 hours





Contacting the  after DC


Call your doctor for:  If your condition worsens





Follow-Up


Follow up with:  Dr. Hauser in 2 weeks











VALERIE HAUSER MD             Feb 19, 2021 08:39

## 2021-02-22 NOTE — PATHOLOGY
[facility] Accession Number: [id number]

.                                                                01

Material submitted:                                        .

gallbladder - GALLBLADDER AND CONTENTS

.                                                                01

Clinical history:                                          .

CHOLECYSTECTOMY

.                                                                02

**********************************************************************

Diagnosis:

Gallbladder, cholecystectomy:

- Cholesterolosis, focal.

- Chronic cholecystitis.

([facility] 02/22/2021)

OLT  02/22/2021  1627 Local

**********************************************************************

.                                                                02

Comment:

There is no evidence of malignancy.

.                                                                02

Electronically signed:                                     .

Rafael Teran MD, Pathologist

NPI- [id number]

.                                                                01

Gross description:                                         .

Received in formalin labeled "Rianna Gay, gallbladder and

contents" is an intact cholecystectomy specimen measuring 7.4 x 3.6 x 3.0

cm. The serosa is tan-pink and smooth and the specimen is opened to reveal

yellow-green velvety mucosa without polyps or masses. The average wall

thickness is 0.1 cm.  A cyst is present at the fundus, measuring 0.9 cm in

greatest dimension.  Calculi are not identified.  Representative sections

of the fundus and body and the cystic duct margin are submitted in A1.

([facility]; 2/20/2021)

[facility]  02/20/2021  1119 Local

.                                                                02

Pathologist provided ICD-10:

K81.1, K82.4

.                                                                02

CPT                                                        .

[id number]

Specimen Comment: Report sent to [id number]

***Performed at:  01

   [facility]

   [address], [place]  [id number]

   MD Ben Mares MD Phone:  [phone]

***Performed at:  02

   [facility]

   [address], [place]  [id number]

   MD Rafael Teran MD Phone:  [phone]

## 2021-02-24 ENCOUNTER — HOSPITAL ENCOUNTER (EMERGENCY)
Dept: HOSPITAL 63 - ER | Age: 46
Discharge: HOME | End: 2021-02-24
Payer: COMMERCIAL

## 2021-02-24 VITALS
DIASTOLIC BLOOD PRESSURE: 88 MMHG | SYSTOLIC BLOOD PRESSURE: 116 MMHG | DIASTOLIC BLOOD PRESSURE: 88 MMHG | SYSTOLIC BLOOD PRESSURE: 116 MMHG

## 2021-02-24 VITALS — WEIGHT: 222.67 LBS | HEIGHT: 65 IN | BODY MASS INDEX: 37.1 KG/M2

## 2021-02-24 DIAGNOSIS — Z90.49: ICD-10-CM

## 2021-02-24 DIAGNOSIS — N39.0: Primary | ICD-10-CM

## 2021-02-24 LAB
ALBUMIN SERPL-MCNC: 3.2 G/DL (ref 3.4–5)
ALP SERPL-CCNC: 47 U/L (ref 46–116)
ALT SERPL-CCNC: 35 U/L (ref 14–59)
AMPHETAMINE/METHAMPHETAMINE: (no result)
ANION GAP SERPL CALC-SCNC: 6 MMOL/L (ref 6–14)
APTT PPP: YELLOW S
AST SERPL-CCNC: 17 U/L (ref 15–37)
BACTERIA #/AREA URNS HPF: (no result) /HPF
BARBITURATES UR-MCNC: (no result) UG/ML
BASOPHILS # BLD AUTO: 0.1 X10^3/UL (ref 0–0.2)
BASOPHILS NFR BLD: 1 % (ref 0–3)
BENZODIAZ UR-MCNC: (no result) UG/L
BILIRUB DIRECT SERPL-MCNC: 0.1 MG/DL (ref 0–0.2)
BILIRUB SERPL-MCNC: 0.2 MG/DL (ref 0.2–1)
BILIRUB UR QL STRIP: (no result)
CA-I SERPL ISE-MCNC: 19 MG/DL (ref 7–20)
CALCIUM SERPL-MCNC: 9.2 MG/DL (ref 8.5–10.1)
CANNABINOIDS UR-MCNC: (no result) UG/L
CHLORIDE SERPL-SCNC: 106 MMOL/L (ref 98–107)
CO2 SERPL-SCNC: 29 MMOL/L (ref 21–32)
COCAINE UR-MCNC: (no result) NG/ML
CREAT SERPL-MCNC: 1 MG/DL (ref 0.6–1)
EOSINOPHIL NFR BLD: 0.2 X10^3/UL (ref 0–0.7)
EOSINOPHIL NFR BLD: 3 % (ref 0–3)
ERYTHROCYTE [DISTWIDTH] IN BLOOD BY AUTOMATED COUNT: 14 % (ref 11.5–14.5)
FIBRINOGEN PPP-MCNC: (no result) MG/DL
GFR SERPLBLD BASED ON 1.73 SQ M-ARVRAT: 60.2 ML/MIN
GLUCOSE SERPL-MCNC: 101 MG/DL (ref 70–99)
GLUCOSE UR STRIP-MCNC: (no result) MG/DL
HCT VFR BLD CALC: 40.1 % (ref 36–47)
HGB BLD-MCNC: 13 G/DL (ref 12–15.5)
LIPASE: 208 U/L (ref 73–393)
LYMPHOCYTES # BLD: 1.5 X10^3/UL (ref 1–4.8)
LYMPHOCYTES NFR BLD AUTO: 22 % (ref 24–48)
MCH RBC QN AUTO: 30 PG (ref 25–35)
MCHC RBC AUTO-ENTMCNC: 32 G/DL (ref 31–37)
MCV RBC AUTO: 93 FL (ref 79–100)
METHADONE SERPL-MCNC: (no result) NG/ML
MONO #: 0.7 X10^3/UL (ref 0–1.1)
MONOCYTES NFR BLD: 11 % (ref 0–9)
NEUT #: 4.2 X10^3UL (ref 1.8–7.7)
NEUTROPHILS NFR BLD AUTO: 63 % (ref 31–73)
NITRITE UR QL STRIP: (no result)
OPIATES UR-MCNC: (no result) NG/ML
PCP SERPL-MCNC: (no result) MG/DL
PLATELET # BLD AUTO: 265 X10^3/UL (ref 140–400)
POTASSIUM SERPL-SCNC: 4.2 MMOL/L (ref 3.5–5.1)
PROT SERPL-MCNC: 7.2 G/DL (ref 6.4–8.2)
RBC # BLD AUTO: 4.33 X10^6/UL (ref 3.5–5.4)
RBC #/AREA URNS HPF: >40 /HPF (ref 0–2)
SODIUM SERPL-SCNC: 141 MMOL/L (ref 136–145)
SP GR UR STRIP: 1.01
SQUAMOUS #/AREA URNS LPF: (no result) /LPF
U PREG PATIENT: NEGATIVE
UROBILINOGEN UR-MCNC: 0.2 MG/DL
WBC # BLD AUTO: 6.7 X10^3/UL (ref 4–11)
WBC #/AREA URNS HPF: >40 /HPF (ref 0–4)

## 2021-02-24 PROCEDURE — 80048 BASIC METABOLIC PNL TOTAL CA: CPT

## 2021-02-24 PROCEDURE — 80307 DRUG TEST PRSMV CHEM ANLYZR: CPT

## 2021-02-24 PROCEDURE — 96374 THER/PROPH/DIAG INJ IV PUSH: CPT

## 2021-02-24 PROCEDURE — 74177 CT ABD & PELVIS W/CONTRAST: CPT

## 2021-02-24 PROCEDURE — 99285 EMERGENCY DEPT VISIT HI MDM: CPT

## 2021-02-24 PROCEDURE — 81001 URINALYSIS AUTO W/SCOPE: CPT

## 2021-02-24 PROCEDURE — 84484 ASSAY OF TROPONIN QUANT: CPT

## 2021-02-24 PROCEDURE — 82550 ASSAY OF CK (CPK): CPT

## 2021-02-24 PROCEDURE — 71045 X-RAY EXAM CHEST 1 VIEW: CPT

## 2021-02-24 PROCEDURE — 96361 HYDRATE IV INFUSION ADD-ON: CPT

## 2021-02-24 PROCEDURE — 36415 COLL VENOUS BLD VENIPUNCTURE: CPT

## 2021-02-24 PROCEDURE — 83690 ASSAY OF LIPASE: CPT

## 2021-02-24 PROCEDURE — 93005 ELECTROCARDIOGRAM TRACING: CPT

## 2021-02-24 PROCEDURE — 80076 HEPATIC FUNCTION PANEL: CPT

## 2021-02-24 PROCEDURE — 85025 COMPLETE CBC W/AUTO DIFF WBC: CPT

## 2021-02-24 PROCEDURE — 81025 URINE PREGNANCY TEST: CPT

## 2021-02-24 PROCEDURE — 87086 URINE CULTURE/COLONY COUNT: CPT

## 2021-02-24 NOTE — RAD
CT ABDOMEN+PELVIS W



History: Reason: ruq pain / Spl. Instructions:  / History: 



Comparison: CT abdomen and pelvis 11/6/2019



Technique: CT abdomen pelvis performed with intravenous contrast.



Findings:

Lung bases demonstrate an approximately 9 mm groundglass nodule left lower lobe (axial image 7). Estiven
tional areas of linear atelectasis in the visualized lungs. No pleural or pericardial effusion.



Normal hepatic contour without mass. Cholecystectomy clips. No significant biliary ductal dilatation.
 The pancreas, spleen, and adrenal glands are unremarkable. Redemonstrated are multiple small, 2-5 mm
, bilateral nephroliths. No evidence of hydronephrosis or hydroureter. Bilateral sub-5 mm renal hypod
ensities are too small to completely characterize most consistent with benign cysts.



The stomach, small bowel, appendix, and colon are unremarkable. No abdominopelvic adenopathy, free fl
uid or free air. Abdominopelvic vasculature is unremarkable. Normal uterus and adnexa. Redemonstrated
 benign pelvic phleboliths. The bladder is decompressed but otherwise without focal abnormality. Supe
rior endplate L1 vertebral body Schmorl's node. Mild disc space narrowing L5-S1. No acute or suspicio
us osseous lesions.



There is an umbilical hernia measuring 3.4 x 1.8 x 2.2 cm with approximately 1.1 x 0.9 cm neck and pr
ominent soft tissue stranding inferiorly. The fat-containing umbilical hernia appears similar to comp
arison, however infraumbilical subcutaneous fat stranding is new.



Impression: 



1.  New subcutaneous fat stranding inferior to redemonstrated fat-containing umbilical hernia may rep
resent inflammatory process such as cellulitis. Correlate with exam.

2.  Bilateral nonobstructing nephrolithiasis.

3.  Left lower lobe groundglass nodule measuring 9 mm, suspicious for infectious process. Recommend n
oncontrast CT chest at 3-6 months to evaluate for persistence.





------

Exposure: One or more of the following individualized dose reduction techniques were utilized for thi
s examination:  

1. Automated exposure control

2. Adjustment of the mA and/or kV according to patient size

3. Use of iterative reconstruction technique.



Electronically signed by: Mark Santana MD (2/24/2021 9:09 AM) [facility]

## 2021-02-24 NOTE — RAD
AP chest.



HISTORY: Right upper quadrant pain



AP view was taken of the chest. Lungs are free of infiltrates. There is no pleural effusion. Heart is
 normal in size.



IMPRESSION:

1. No acute chest disease.



Electronically signed by: Lonnie Robert MD (2/24/2021 8:40 AM) [id number]

## 2021-02-24 NOTE — EKG
[facility] [address], [place] [address]

Test Date:    2021               Test Time:    08:28:55

Pat Name:     COOPER ASTORGA       Department:   

Patient ID:   [id number]           Room:          

Gender:       F                        Technician:   MAYUR

:          1975               Requested By: SHELTON FONTENOT

Order Number: [id number]            Reading MD:     

                                 Measurements

Intervals                              Axis          

Rate:         62                       P:            36

ND:           166                      QRS:          13

QRSD:         68                       T:            16

QT:           400                                    

QTc:          408                                    

                           Interpretive Statements

SINUS RHYTHM

NORMAL ECG

RI6.02

No previous ECG available for comparison

## 2021-02-24 NOTE — PHYS DOC
Past History


Past Medical History:  No Pertinent History


Past Surgical History:  Cholecystectomy


Smoking:  Non-smoker


Alcohol Use:  None


Drug Use:  None





General Adult


EDM:


Chief Complaint:  PAIN CONTROL





HPI:


HPI:


46-year-old female with no significant past medical history presents the ED with

complaints of





Review of Systems:


Review of Systems:


Constitutional:  Denies fever or chills 


Eyes:  Denies change in visual acuity 


HENT:  Denies nasal congestion or sore throat 


Respiratory:  Denies cough or shortness of breath 


Cardiovascular:  Denies chest pain or edema 


GI:  Denies abdominal pain, nausea, vomiting, bloody stools or diarrhea 


: Denies dysuria 


Musculoskeletal:  Denies back pain or joint pain 


Integument:  Denies rash 


Neurologic:  Denies headache, focal weakness or sensory changes 


Endocrine:  Denies polyuria or polydipsia 


Lymphatic:  Denies swollen glands 


Psychiatric:  Denies depression or anxiety





Current Medications:


Current Meds:





Current Medications








 Medications


  (Trade)  Dose


 Ordered  Sig/Lala  Start Time


 Stop Time Status Last Admin


Dose Admin


 


 Iohexol


  (Omnipaque 300


 Mg/ml)  75 ml  1X  ONCE  21 08:30


 21 08:31 DC 21 08:41


75 ML


 


 Lactated Ringer's  1,000 ml @ 


 1,000 mls/hr  Q1H  21 08:30


 21 09:29 DC 21 08:32


1,000 MLS/HR











Allergies:


Allergies:





Allergies








Coded Allergies Type Severity Reaction Last Updated Verified


 


  No Known Drug Allergies    19 No











Physical Exam:


PE:





Constitutional: Well developed, well nourished, no acute distress, non-toxic 

appearance. 


HENT: Normocephalic, atraumatic,


Eyes: EOMI, conjunctiva normal, no discharge.  


Neck: Normal range of motion,  supple, 


Cardiovascular: S1/2 present, regular rhythm


Lungs & Thorax: Speaking in full sentences, bilateral equal chest rise, no 

tachypnea or increased work of breathing


Abdomen:  soft, no tenderness, 


Skin: Warm, dry, no erythema, no rash. [] 


Back: No tenderness, no CVA tenderness. [] 


Extremities: No tenderness, no cyanosis, no lower extremity edema


Neurologic: Alert and oriented X 3, normal motor function, normal sensory 

function, no focal deficits noted. []


Psychologic: Affect normal, judgement normal, mood normal. []





Current Patient Data:


Labs:





                                Laboratory Tests








Test


 21


08:25


 


White Blood Count


 6.7 x10^3/uL


(4.0-11.0)


 


Red Blood Count


 4.33 x10^6/uL


(3.50-5.40)


 


Hemoglobin


 13.0 g/dL


(12.0-15.5)


 


Hematocrit


 40.1 %


(36.0-47.0)


 


Mean Corpuscular Volume


 93 fL ()





 


Mean Corpuscular Hemoglobin 30 pg (25-35)  


 


Mean Corpuscular Hemoglobin


Concent 32 g/dL


(31-37)


 


Red Cell Distribution Width


 14.0 %


(11.5-14.5)


 


Platelet Count


 265 x10^3/uL


(140-400)


 


Neutrophils (%) (Auto) 63 % (31-73)  


 


Lymphocytes (%) (Auto) 22 % (24-48)  L


 


Monocytes (%) (Auto) 11 % (0-9)  H


 


Eosinophils (%) (Auto) 3 % (0-3)  


 


Basophils (%) (Auto) 1 % (0-3)  


 


Neutrophils # (Auto)


 4.2 x10^3uL


(1.8-7.7)


 


Lymphocytes # (Auto)


 1.5 x10^3/uL


(1.0-4.8)


 


Monocytes # (Auto)


 0.7 x10^3/uL


(0.0-1.1)


 


Eosinophils # (Auto)


 0.2 x10^3/uL


(0.0-0.7)


 


Basophils # (Auto)


 0.1 x10^3/uL


(0.0-0.2)


 


Sodium Level


 141 mmol/L


(136-145)


 


Potassium Level


 4.2 mmol/L


(3.5-5.1)


 


Chloride Level


 106 mmol/L


()


 


Carbon Dioxide Level


 29 mmol/L


(21-32)


 


Anion Gap 6 (6-14)  


 


Blood Urea Nitrogen


 19 mg/dL


(7-20)


 


Creatinine


 1.0 mg/dL


(0.6-1.0)


 


Estimated GFR


(Cockcroft-Gault) 60.2  





 


Glucose Level


 101 mg/dL


(70-99)  H


 


Calcium Level


 9.2 mg/dL


(8.5-10.1)


 


Total Bilirubin


 0.2 mg/dL


(0.2-1.0)


 


Direct Bilirubin


 0.1 mg/dL


(0.0-0.2)


 


Aspartate Amino Transferase


(AST) 17 U/L (15-37)





 


Alanine Aminotransferase (ALT)


 35 U/L (14-59)





 


Alkaline Phosphatase


 47 U/L


()


 


Creatine Kinase


 47 U/L


()


 


Troponin I Quantitative


 < 0.017 ng/mL


(0-0.055)


 


Total Protein


 7.2 g/dL


(6.4-8.2)


 


Albumin


 3.2 g/dL


(3.4-5.0)  L


 


Lipase


 208 U/L


()








Vital Signs:





                                   Vital Signs








  Date Time  Temp Pulse Resp B/P (MAP) Pulse Ox O2 Delivery O2 Flow Rate FiO2


 


21 07:58 97.6 91 18 146/83 (104) 98 Room Air  











EKG:


EKG:


Sinus rhythm at 62 bpm, no axis deviation, normal intervals, T wave inversion 

lead III, no ST elevations or ST depressions, denies any chest pressure 

heaviness or tightness





Radiology/Procedures:


Radiology/Procedures:


                                 IMAGING REPORT





                                     Signed





PATIENT: COOPER ASTORGA DACCOUNT: [id number]     MRN#: [id number]


: 1975           LOCATION: ER              AGE: 46


SEX: F                    EXAM DT: 21         ACCESSION#: [id number]


STATUS: REG ER            ORD. PHYSICIAN: SHELTON FONTENOT DO


REASON: ruq pain


PROCEDURE: CT ABD PELV W/ IV CONTRST ONLY





CT ABDOMEN+PELVIS W





History: Reason: ruq pain / Spl. Instructions:  / History: 





Comparison: CT abdomen and pelvis 2019





Technique: CT abdomen pelvis performed with intravenous contrast.





Findings:


Lung bases demonstrate an approximately 9 mm groundglass nodule left lower lobe 

(axial image 7). Additional areas of linear atelectasis in the visualized lungs.

 No pleural or pericardial effusion.





Normal hepatic contour without mass. Cholecystectomy clips. No significant 

biliary ductal dilatation. The pancreas, spleen, and adrenal glands are 

unremarkable. Redemonstrated are multiple small, 2-5 mm, bilateral nephroliths. 

No evidence of hydronephrosis or hydroureter. Bilateral sub-5 mm renal hy

podensities are too small to completely characterize most consistent with benign

 cysts.





The stomach, small bowel, appendix, and colon are unremarkable. No 

abdominopelvic adenopathy, free fluid or free air. Abdominopelvic vasculature is

 unremarkable. Normal uterus and adnexa. Redemonstrated benign pelvic 

phleboliths. The bladder is decompressed but otherwise without focal abnormali

ty. Superior endplate L1 vertebral body Schmorl's node. Mild disc space 

narrowing L5-S1. No acute or suspicious osseous lesions.





There is an umbilical hernia measuring 3.4 x 1.8 x 2.2 cm with approximately 1.1

 x 0.9 cm neck and prominent soft tissue stranding inferiorly. The fat-

containing umbilical hernia appears similar to comparison, however 

infraumbilical subcutaneous fat stranding is new.





Impression: 





1.  New subcutaneous fat stranding inferior to redemonstrated fat-containing 

umbilical hernia may represent inflammatory process such as cellulitis. 

Correlate with exam.


2.  Bilateral nonobstructing nephrolithiasis.


3.  Left lower lobe groundglass nodule measuring 9 mm, suspicious for infectious

 process. Recommend noncontrast CT chest at 3-6 months to evaluate for 

persistence.








------


Exposure: One or more of the following individualized dose reduction techniques 

were utilized for this examination:  


1. Automated exposure control


2. Adjustment of the mA and/or kV according to patient size


3. Use of iterative reconstruction technique.





Electronically signed by: Mark Mccain MD (2021 9:09 AM) [facility]














DICTATED AND SIGNED BY:     MARK MCCAIN MD


DATE:     21 0857





CC: SHELTON GARRETT; SHELTON FONTENOT DO ~MTH0 0


                                 IMAGING REPORT





                                     Signed





PATIENT: COOPER ASTORGA DACCOUNT: [id number]     MRN#: [id number]


: 1975           LOCATION: ER              AGE: 46


SEX: F                    EXAM DT: 21         ACCESSION#: [id number]


STATUS: REG ER            ORD. PHYSICIAN: SHELTON FONTENOT DO


REASON: ruq pain


PROCEDURE: PORTABLE CHEST 1V





AP chest.





HISTORY: Right upper quadrant pain





AP view was taken of the chest. Lungs are free of infiltrates. There is no 

pleural effusion. Heart is normal in size.





IMPRESSION:


1. No acute chest disease.





Electronically signed by: Lonnie Robert MD (2021 8:40 AM) [id number]














DICTATED AND SIGNED BY:     LONNIE ROBERT MD


DATE:     21 0840





CC: SHELTON GARRETT; SHELTON FONTENOT DO ~MTH0 0





Heart Score:


Risk Factors:


Risk Factors:  DM, Current or recent (<one month) smoker, HTN, HLP, family 

history of CAD, obesity.


Risk Scores:


Score 0 - 3:  2.5% MACE over next 6 weeks - Discharge Home


Score 4 - 6:  20.3% MACE over next 6 weeks - Admit for Clinical Observation


Score 7 - 10:  72.7% MACE over next 6 weeks - Early Invasive Strategies





Course & Med Decision Making:


Course & Med Decision Making


Pertinent Labs and Imaging studies reviewed. (See chart for details)





CT report given to patient and instructed to follow-up on her lung nodule with 

her primary care physician.  Will discharge home with strict ED return 

precautions were given for []. Encouraged urgent outpatient follow-up with PMD 

and her surgeon.  Life-threatening processes were considered but are low 

suspicion at this time, given history, physical exam and ED workup. Pt was e

ducated on all prescription medications and adverse effects.  All patient's 

questions were answered and pt was stable at time of discharge.





Life/limb-threatening differential includes but is not limited to, aortic 

dissection, aortic aneurysm, acute coronary syndrome, surgical abdomen 

(appendicitis, cholecystitis, ischemic bowel, strangulated hernia, etc), bowel 

obstruction or volvulus, bladder outlet obstruction, gastrointestinal bleeding, 

inflammatory bowel disease, peptic ulcer disease, ACS/CAD, sepsis, diverticular 

disease, ureterolithiasis,  nephrolithiasis, ovarian or testicular torsion, 

ectopic pregnancy, vaginal hemorrhage, or genitourinary infection.





I spoken with the patient and her caregivers.  I explained the patient's 

condition, diagnoses and treatment plan based on the information available to me

 at this time.  I have answered the patient and her caregiver's questions and 

addressed any concerns.  The patient and her caregivers have a good 

understanding of patient's diagnosis, condition and treatment plan as can be 

expected at this point.  Vital signs have been stable.  Patient's condition is 

stable and appropriate for discharge from the emergency department. 





Patient will pursue further outpatient evaluation with primary care physician or

 other designated or consulting physician as outlined in the discharge 

instructions.  The patient and/or caregivers are agreeable to this plan of care 

and follow-up instructions have been explained in detail.  The patient and/or 

caregivers have received these instructions in written form and have expressed 

an understanding of the discharge instructions.  The patient and/or caregivers 

are aware that any significant change of condition or worsening of symptoms 

should prompt immediate return to this or the closest emergency department or 

call to [facility] Disclaimer:


Dragon Disclaimer:


This electronic medical record was generated, in whole or in part, using a voice

 recognition dictation system.





Departure


Departure:


Impression:  


   Primary Impression:  


   Abdominal pain


   Additional Impression:  


   UTI (urinary tract infection)


Disposition:  01 DC HOME SELF CARE/HOMELESS


Condition:  STABLE


Referrals:  


SHELTON GARRETT (PCP)


in 1-2 days


Patient Instructions:  Abdominal Pain, Pain Relief Preoperatively and 

Postoperatively





Additional Instructions:  


FOLLOW UP WITH SURGERY:





[facility]


[address], [address]


[place] [address] [phone]





OR





[address] [place] [address] [phone]





EMERGENCY DEPARTMENT GENERAL DISCHARGE INSTRUCTIONS





Thank you for coming to [facility] (ED) today and trusting us

 with you 


care.  We trust that you had a positivie experience in our Emergency Department.

  If you 


wish to speak to the department management, you may call the director at 

[phone].





YOUR FOLLOW UP INSTRUCTIONS ARE AS FOLLOWS:





1.  Do you have a private Doctor?  If you do not have a private doctor, please 

ask for a 


resource list of physicians or clinics that may be able to assist you with 

follow up care.





2.  The Emergency Physician has interpreted your x-rays.  The X-Ray specialist 

will also 


review them.  If there is a change in the findings, you will be notified in 48 

hours when at 


all possible.





3.  A lab test or culture has been done, your results will be reviewed and you 

will be 


notified if you need a change in treatment.





ADDITIONAL INSTRUCTIONS AND INFORMATION:





1.  Your care today has been supervised by a physician who is specially trained 

in emergency 


care.  Many problems require more than one evaluation for a complete diagnosis 

and 


treatment.  We recommend that you schedule your follow up appointment as 

recommended to 


ensure complete treatment of you illness or injury.  If you are unable to obtain

 follow up 


care and continue to have a problem, or if your condition worsens, we recommend 

that you 


return to the ED.





2.  We are not able to safely determine your condition over the phone nor are we

 able to 


give sound medical advice over the phone.  For these safety reasons, if you call

 for medical 


advice we will ask you to come to the ED for further evaluation.





3.  If you have any questions regarding these discharge instructions please call

 the ED at 


[phone].





SAFETY INFORMATION:





In the interest of safety, wellness, and injury prevention; we encourage you to 

wear your 


sealbelt, if you smoke; quite smoking, and we encourage family to use a 

protective helmet 


for bicycling and other sporting events that present an increased risk for head 

injury.





IF YOUR SYMPTOMS WORSEN OR NEW SYMPTOMS DEVELOP, OR YOU HAVE CONCERNS ABOUT YOUR

 CONDITION; 


OR IF YOUR CONDITION WORSENS WHILE YOU ARE WAITING FOR YOUR FOLLOW UP 

APPOINTMENT; EITHER 


CONTACT YOUR PRIMARY CARE DOCTOR, THE PHYSICIAN WHOSE NAME AND NUMBER YOU WERE 

GIVEN, OR 


RETURN TO THE ED IMMEDIATELY.


Scripts


Nitrofurantoin Monohyd/M-Cryst (MACROBID 100 MG CAPSULE) 100 Mg Capsule


1 CAP PO BID for uti for 7 Days, #14 CAP 0 Refills


   Prov: SHELTON FONTENOT DO         21











SHELTON FONTENOT DO               2021 10:56

## 2021-05-21 ENCOUNTER — HOSPITAL ENCOUNTER (OUTPATIENT)
Dept: HOSPITAL 63 - CT | Age: 46
End: 2021-05-21
Attending: PHYSICIAN ASSISTANT
Payer: COMMERCIAL

## 2021-05-21 DIAGNOSIS — N20.0: ICD-10-CM

## 2021-05-21 DIAGNOSIS — K76.0: ICD-10-CM

## 2021-05-21 DIAGNOSIS — M43.8X6: ICD-10-CM

## 2021-05-21 DIAGNOSIS — R91.1: Primary | ICD-10-CM

## 2021-05-21 PROCEDURE — 71250 CT THORAX DX C-: CPT

## 2021-05-21 NOTE — RAD
EXAM: Chest CT without intravenous contrast.



HISTORY: Groundglass nodule follow-up.



TECHNIQUE: Computed tomographic images of the chest were obtained without contrast. Multiplanar refor
matting was performed.



*One or more of the following individualized dose reduction techniques were utilized for this examina
tion:  

1. Automated exposure control.  

2. Adjustment of the mA and/or kV according to patient size.  

3. Use of iterative reconstruction technique.



COMPARISON: 2/24/2021



FINDINGS: The heart is normal in size. The aorta is normal in caliber. No pathologically enlarged lym
ph node is seen. There is no infiltrate, pleural effusion or pneumothorax. There is minimal right api
jacky pleural-parenchymal scarring.



There is mild anterior right middle lobe pleural parenchymal scarring. There is bilateral basilar ate
lectasis. There has been interval increase in an irregular groundglass nodular opacity within the pos
terior superior left lower lobe measuring 1.2 cm.



There is hepatic steatosis. There is no acute finding involving the upper abdomen. There is bilateral
 nephrolithiasis. There are degenerative changes throughout the spine. There is a mild chronic superi
or endplate compression deformity with Schmorl's node at L1.



IMPRESSION: 

1. Slight interval increase in a 1.2 cm groundglass nodular opacity within the left lower lobe, allow
ing for differences in imaging and measurement technique. Given the interval change, CT follow-up in 
6 months is recommended.

2. Incidental hepatic steatosis and bilateral nephrolithiasis.



Electronically signed by: Neha Garcia MD (5/21/2021 10:45 AM) [id number]

## 2021-09-28 ENCOUNTER — HOSPITAL ENCOUNTER (EMERGENCY)
Dept: HOSPITAL 63 - ER | Age: 46
Discharge: HOME | End: 2021-09-28
Payer: COMMERCIAL

## 2021-09-28 VITALS — WEIGHT: 222.23 LBS | HEIGHT: 65 IN | BODY MASS INDEX: 37.02 KG/M2

## 2021-09-28 VITALS — DIASTOLIC BLOOD PRESSURE: 75 MMHG | SYSTOLIC BLOOD PRESSURE: 142 MMHG

## 2021-09-28 DIAGNOSIS — E66.9: ICD-10-CM

## 2021-09-28 DIAGNOSIS — Z90.49: ICD-10-CM

## 2021-09-28 DIAGNOSIS — R10.11: ICD-10-CM

## 2021-09-28 DIAGNOSIS — G89.29: Primary | ICD-10-CM

## 2021-09-28 LAB
ALBUMIN SERPL-MCNC: 3.6 G/DL (ref 3.4–5)
ALBUMIN/GLOB SERPL: 0.9 {RATIO} (ref 1–1.7)
ALP SERPL-CCNC: 53 U/L (ref 46–116)
ALT SERPL-CCNC: 43 U/L (ref 14–59)
ANION GAP SERPL CALC-SCNC: 7 MMOL/L (ref 6–14)
APTT PPP: YELLOW S
AST SERPL-CCNC: 26 U/L (ref 15–37)
BACTERIA #/AREA URNS HPF: 0 /HPF
BASOPHILS # BLD AUTO: 0.1 X10^3/UL (ref 0–0.2)
BASOPHILS NFR BLD: 2 % (ref 0–3)
BILIRUB SERPL-MCNC: 0.6 MG/DL (ref 0.2–1)
BILIRUB UR QL STRIP: (no result)
BUN/CREAT SERPL: 13 (ref 6–20)
CA-I SERPL ISE-MCNC: 14 MG/DL (ref 7–20)
CALCIUM SERPL-MCNC: 9 MG/DL (ref 8.5–10.1)
CHLORIDE SERPL-SCNC: 104 MMOL/L (ref 98–107)
CO2 SERPL-SCNC: 30 MMOL/L (ref 21–32)
CREAT SERPL-MCNC: 1.1 MG/DL (ref 0.6–1)
EOSINOPHIL NFR BLD: 0.1 X10^3/UL (ref 0–0.7)
EOSINOPHIL NFR BLD: 2 % (ref 0–3)
ERYTHROCYTE [DISTWIDTH] IN BLOOD BY AUTOMATED COUNT: 14.1 % (ref 11.5–14.5)
FIBRINOGEN PPP-MCNC: CLEAR MG/DL
GFR SERPLBLD BASED ON 1.73 SQ M-ARVRAT: 54 ML/MIN
GLOBULIN SER-MCNC: 3.8 G/DL (ref 2.2–3.8)
GLUCOSE SERPL-MCNC: 108 MG/DL (ref 70–99)
GLUCOSE UR STRIP-MCNC: (no result) MG/DL
HCT VFR BLD CALC: 41.8 % (ref 36–47)
HGB BLD-MCNC: 13.8 G/DL (ref 12–15.5)
LIPASE: 190 U/L (ref 73–393)
LYMPHOCYTES # BLD: 1.6 X10^3/UL (ref 1–4.8)
LYMPHOCYTES NFR BLD AUTO: 27 % (ref 24–48)
MCH RBC QN AUTO: 31 PG (ref 25–35)
MCHC RBC AUTO-ENTMCNC: 33 G/DL (ref 31–37)
MCV RBC AUTO: 93 FL (ref 79–100)
MONO #: 0.5 X10^3/UL (ref 0–1.1)
MONOCYTES NFR BLD: 9 % (ref 0–9)
NEUT #: 3.5 X10^3UL (ref 1.8–7.7)
NEUTROPHILS NFR BLD AUTO: 60 % (ref 31–73)
NITRITE UR QL STRIP: (no result)
PLATELET # BLD AUTO: 264 X10^3/UL (ref 140–400)
POTASSIUM SERPL-SCNC: 4.1 MMOL/L (ref 3.5–5.1)
PROT SERPL-MCNC: 7.4 G/DL (ref 6.4–8.2)
RBC # BLD AUTO: 4.49 X10^6/UL (ref 3.5–5.4)
RBC #/AREA URNS HPF: (no result) /HPF (ref 0–2)
SODIUM SERPL-SCNC: 141 MMOL/L (ref 136–145)
SP GR UR STRIP: 1.02
SQUAMOUS #/AREA URNS LPF: (no result) /LPF
U PREG PATIENT: NEGATIVE
UROBILINOGEN UR-MCNC: 1 MG/DL
WBC # BLD AUTO: 5.9 X10^3/UL (ref 4–11)
WBC #/AREA URNS HPF: (no result) /HPF (ref 0–4)

## 2021-09-28 PROCEDURE — 74177 CT ABD & PELVIS W/CONTRAST: CPT

## 2021-09-28 PROCEDURE — 83690 ASSAY OF LIPASE: CPT

## 2021-09-28 PROCEDURE — 99285 EMERGENCY DEPT VISIT HI MDM: CPT

## 2021-09-28 PROCEDURE — 87086 URINE CULTURE/COLONY COUNT: CPT

## 2021-09-28 PROCEDURE — 81025 URINE PREGNANCY TEST: CPT

## 2021-09-28 PROCEDURE — 80053 COMPREHEN METABOLIC PANEL: CPT

## 2021-09-28 PROCEDURE — 36415 COLL VENOUS BLD VENIPUNCTURE: CPT

## 2021-09-28 PROCEDURE — 81001 URINALYSIS AUTO W/SCOPE: CPT

## 2021-09-28 PROCEDURE — 85025 COMPLETE CBC W/AUTO DIFF WBC: CPT

## 2021-09-28 NOTE — PHYS DOC
Past History


Past Medical History:  No Pertinent History


Past Surgical History:  Cholecystectomy


Smoking:  Non-smoker


Alcohol Use:  None


Drug Use:  None





General Adult


EDM:


Chief Complaint:  ABDOMINAL PAIN





HPI:


HPI:


46-year-old female past medical history of obesity comes into the ED with 

complaints of  sharp, cramping, intermittent nonradiating right upper quadrant 

pain that has been present for the past 2 years, reports no relief with levar

cystectomy, worse with greasy and spicy foods.  Patient states she saw her PCP 

Morenita Howard regarding her CT findings in May for this and was referred to a 

specialist, thinks it was a GI physician.  Was told by the specialist that she 

may have a lung nodule and would need a follow-up repeat CT scan in 2021.  No history of EGD or colonoscopy.  Reports no routine alcohol use, 

possible wine less than 10 times per year. No h/o kidney stones or blunt trauma.





Review of Systems:


Review of Systems:


Constitutional:  Denies fever or chills 


Eyes:  Denies change in visual acuity 


HENT:  Denies nasal congestion or sore throat 


Respiratory:  Denies cough or shortness of breath or hemoptysis


Cardiovascular:  Denies chest pain or edema 


GI:  Denies  nausea, vomiting, bloody stools or diarrhea 


: Denies dysuria or vaginal bleeding


Musculoskeletal:  Denies back pain or joint pain 


Integument:  Denies rash or diaphoresis


Neurologic:  Denies headache, focal weakness or sensory changes 


Endocrine:  Denies polyuria or polydipsia 


Lymphatic:  Denies swollen glands 


Psychiatric:  Denies depression or anxiety





Current Medications:


Current Meds:





Current Medications








 Medications


  (Trade)  Dose


 Ordered  Sig/Lala  Start Time


 Stop Time Status Last Admin


Dose Admin


 


 Info


  (Do NOT chart on


 this entry -- for


 MONITORING)  1 each  PRN DAILY  PRN  21 08:30


 21 08:29   





 


 Iohexol


  (Omnipaque 300


 Mg/ml)  75 ml  1X  ONCE  21 08:30


 21 08:31 DC 21 08:41


75 ML











Allergies:


Allergies:





Allergies








Coded Allergies Type Severity Reaction Last Updated Verified


 


  No Known Drug Allergies    21 No











Physical Exam:


PE:





Constitutional: Well developed, well nourished, no acute distress, non-toxic 

appearance. 


HENT: Normocephalic, atraumatic,


Eyes: EOMI, conjunctiva normal, no discharge.  


Neck: Normal range of motion,  supple, 


Cardiovascular: S1/2 present, regular rhythm


Lungs & Thorax: Speaking in full sentences, bilateral equal chest rise, no 

tachypnea or increased work of breathing


Abdomen:  soft, no McBurney's point tenderness, no Pereira sign, no rigidity or 

guarding, pain is localized over right lower anterior ribs with normal skin 

color/no bruising


Skin: Warm, dry, no erythema, no rash. [] 


Back: No tenderness, no CVA tenderness. [] 


Extremities: No tenderness, no cyanosis, no lower extremity edema


Neurologic: Alert and oriented X 3, normal motor function, normal sensory 

function, no focal deficits noted. []


Psychologic: Affect normal, judgement normal, mood normal. []





Current Patient Data:


Labs:





                                Laboratory Tests








Test


 21


08:20


 


White Blood Count


 5.9 x10^3/uL


(4.0-11.0)


 


Red Blood Count


 4.49 x10^6/uL


(3.50-5.40)


 


Hemoglobin


 13.8 g/dL


(12.0-15.5)


 


Hematocrit


 41.8 %


(36.0-47.0)


 


Mean Corpuscular Volume


 93 fL ()





 


Mean Corpuscular Hemoglobin 31 pg (25-35)  


 


Mean Corpuscular Hemoglobin


Concent 33 g/dL


(31-37)


 


Red Cell Distribution Width


 14.1 %


(11.5-14.5)


 


Platelet Count


 264 x10^3/uL


(140-400)


 


Neutrophils (%) (Auto) 60 % (31-73)  


 


Lymphocytes (%) (Auto) 27 % (24-48)  


 


Monocytes (%) (Auto) 9 % (0-9)  


 


Eosinophils (%) (Auto) 2 % (0-3)  


 


Basophils (%) (Auto) 2 % (0-3)  


 


Neutrophils # (Auto)


 3.5 x10^3uL


(1.8-7.7)


 


Lymphocytes # (Auto)


 1.6 x10^3/uL


(1.0-4.8)


 


Monocytes # (Auto)


 0.5 x10^3/uL


(0.0-1.1)


 


Eosinophils # (Auto)


 0.1 x10^3/uL


(0.0-0.7)


 


Basophils # (Auto)


 0.1 x10^3/uL


(0.0-0.2)


 


Sodium Level


 141 mmol/L


(136-145)


 


Potassium Level


 4.1 mmol/L


(3.5-5.1)


 


Chloride Level


 104 mmol/L


()


 


Carbon Dioxide Level


 30 mmol/L


(21-32)


 


Anion Gap 7 (6-14)  


 


Blood Urea Nitrogen


 14 mg/dL


(7-20)


 


Creatinine


 1.1 mg/dL


(0.6-1.0)  H


 


Estimated GFR


(Cockcroft-Gault) 54.0  





 


BUN/Creatinine Ratio 13 (6-20)  


 


Glucose Level


 108 mg/dL


(70-99)  H


 


Calcium Level


 9.0 mg/dL


(8.5-10.1)


 


Total Bilirubin


 0.6 mg/dL


(0.2-1.0)


 


Aspartate Amino Transferase


(AST) 26 U/L (15-37)





 


Alanine Aminotransferase (ALT)


 43 U/L (14-59)





 


Alkaline Phosphatase


 53 U/L


()


 


Total Protein


 7.4 g/dL


(6.4-8.2)


 


Albumin


 3.6 g/dL


(3.4-5.0)


 


Albumin/Globulin Ratio


 0.9 (1.0-1.7)


L


 


Lipase


 190 U/L


()








Vital Signs:





                                   Vital Signs








  Date Time  Temp Pulse Resp B/P (MAP) Pulse Ox O2 Delivery O2 Flow Rate FiO2


 


21 07:49 97.7 71 16 130/66 (87) 99 Room Air  











EKG:


EKG:


[]





Radiology/Procedures:


Radiology/Procedures:


[]IMAGING REPORT





                                     Signed





PATIENT: COOPER ASTORGA DACCOUNT: [id number]     MRN#: [id number]


: 1975           LOCATION: ER              AGE: 46


SEX: F                    EXAM DT: 21         ACCESSION#: [id number]


STATUS: REG ER            ORD. PHYSICIAN: MORENITA FONTENOT DO


REASON: RUQ PAIN, CHOLECYSTECTOMY .


PROCEDURE: CT ABD PELV W/ IV CONTRST ONLY





CT scan of the abdomen and pelvis with contrast  2020








CLINICAL HISTORY: Right upper quadrant abdominal pain.





TECHNIQUE: After the intravenous administration of 75 cc of Isovue-370 only, 

contiguous, 5 mm axial sections were obtained through the abdomen and pelvis.





One or more of the following individualized dose reduction techniques were 

utilized for this study:





1. Automated exposure control.


2. Adjustment of the mA and/or kV according to patient size.


3. Use of iterative reconstruction technique.





FINDINGS: Comparison study is dated 2021.





Images through the lung bases demonstrate minimal dependent subsegmental 

atelectasis bilaterally.





The liver parenchyma has a decreased attenuation consistent with fatty 

infiltration. The spleen, pancreas, and adrenal glands are within normal limits.

 Nonobstructing renal calculi are seen bilaterally which measure 2 to 4 mm in 

size. There is no evidence of obstruction of either collecting system. Rounded 

low-attenuation lesions are seen involving both kidneys which measure 3 to 5 mm 

in size. These likely represent cysts. No further imaging evaluation is 

recommended.





The abdominal aorta tapers normally. Surgical clips are seen within the 

gallbladder fossa consistent with a cholecystectomy. Air and stool are seen 

throughout the colon. Mildly dilated fluid and air-filled small bowel loops are 

seen within the left lower quadrant abdomen without definite evidence of bowel 

obstruction. The appendix is well-visualized and is within normal limits. No 

free fluid or free air is seen within the abdomen. A 4 cm fat-containing 

umbilical hernia is noted.





Images through pelvis demonstrate the urinary bladder distended with urine. 

Calcifications are seen within the pelvis consistent with phleboliths. No a

dnexal mass is seen. No free fluid is noted. Minimal S-shaped curvature of the 

thoracolumbar spine is seen. Degenerative changes are seen involving lower 

thoracic and throughout the lumbar spine along with both hips.





IMPRESSION: No acute abnormality is seen.





Electronically signed by: Chintan Watt MD (2021 9:08 AM) [id number]














DICTATED AND SIGNED BY:     CHINTAN WATT MD


DATE:     21 0854





CC: MORENITA GARRETT; MORENITA FONTENOT DO ~MTH0 0





Heart Score:


C/O Chest Pain:  No


Risk Factors:


Risk Factors:  DM, Current or recent (<one month) smoker, HTN, HLP, family 

history of CAD, obesity.


Risk Scores:


Score 0 - 3:  2.5% MACE over next 6 weeks - Discharge Home


Score 4 - 6:  20.3% MACE over next 6 weeks - Admit for Clinical Observation


Score 7 - 10:  72.7% MACE over next 6 weeks - Early Invasive Strategies





Course & Med Decision Making:


Course & Med Decision Making


Pertinent Labs and Imaging studies reviewed. (See chart for details)





Concern for acute on chronic right upper quadrant abdominal pain, worse after 

spicy and greasy foods. No associated bloody stools, nausea, vomiting or chest 

pain. CT imaging with no acute process, no obstruction, decreasing attenuation 

of fatty liver, likely renal cysts and umbilical hernia.  Labs with no 

leukocytosis, creatinine 1.1.  Urinalysis with 0 bacteria, is a contaminated 

sample. Ddx is broad-adhesions? diveritucula? endometriosis? etc.  Will 

prescribe trial of Pepcid and recommend low-fat diet instructions.  Patient is 

calm, hemodynamically stable and symptoms have been present for approximately 2 

years. Will discharge home with strict ED return precautions were given for 

severe pain, dehydration, syncope or fever. Encouraged urgent outpatient follow-

up with PMD and GI.  Life-threatening processes were considered but are low 

suspicion at this time, given history, physical exam and ED workup. Pt was 

educated on all prescription medications and adverse effects.  All patient's 

questions were answered and pt was stable at time of discharge.





Life/limb-threatening differential includes but is not limited to, aortic 

dissection, aortic aneurysm, acute coronary syndrome, surgical abdomen 

(appendicitis, cholecystitis, ischemic bowel, strangulated hernia, etc), bowel 

obstruction or volvulus, bladder outlet obstruction, gastrointestinal bleeding, 

inflammatory bowel disease, peptic ulcer disease, ACS/CAD, sepsis, diverticular 

disease, ureterolithiasis,  nephrolithiasis, ovarian or testicular torsion, 

ectopic pregnancy, vaginal hemorrhage, or genitourinary infection.





I have spoken with the patient and/or caregivers.  I explained the patient's 

condition, diagnoses and treatment plan based on the information available to me

 at this time.  I have answered the patient and/or caregiver's questions and 

addressed any concerns.  The patient and/or caregivers have a good understanding

 of patient's diagnosis, condition and treatment plan as can be expected at this

 point.  Vital signs have been stable.  Patient's condition is stable and 

appropriate for discharge from the emergency department. 





Patient will pursue further outpatient evaluation with primary care physician or

 other designated or consulting physician as outlined in the discharge 

instructions.  The patient and/or caregivers are agreeable to this plan of care 

and follow-up instructions have been explained in detail.  The patient and/or 

caregivers have received these instructions in written form and have expressed 

an understanding of the discharge instructions.  The patient and/or caregivers 

are aware that any significant change of condition or worsening of symptoms 

should prompt immediate return to this or the closest emergency department or 

call to [phone].





Luis Disclaimer:


Dragon Disclaimer:


This electronic medical record was generated, in whole or in part, using a voice

 recognition dictation system.





Departure


Departure:


Impression:  


   Primary Impression:  


   Abdominal pain, chronic, right upper quadrant


Disposition:   HOME / SELF CARE / HOMELESS


Condition:  STABLE


Referrals:  


MORENITA GARRETT (PCP)


Follow up with your pcp in 1-2 days or


[facility]


[phone] 


OR


[facility]-Dr. Huynh


[phone]


Patient Instructions:  Abdominal Pain





Additional Instructions:  


FOLLOW UP WITH GASTROENTEROLOGY: to pursue other causes of right upper abdominal

 pain


[facility]


[address], [address]


[place] [address] [phone]





OR





[facility]


[address], [address], [place] [address]


[phone]





EMERGENCY DEPARTMENT GENERAL DISCHARGE INSTRUCTIONS





Thank you for coming to [facility] (ED) today and trusting us

 with you 


care.  We trust that you had a positivie experience in our Emergency Department.

  If you 


wish to speak to the department management, you may call the director at 

[phone].





YOUR FOLLOW UP INSTRUCTIONS ARE AS FOLLOWS:





1.  Do you have a private Doctor?  If you do not have a private doctor, please 

ask for a 


resource list of physicians or clinics that may be able to assist you with 

follow up care.





2.  The Emergency Physician has interpreted your x-rays.  The X-Ray specialist 

will also 


review them.  If there is a change in the findings, you will be notified in 48 

hours when at 


all possible.





3.  A lab test or culture has been done, your results will be reviewed and you 

will be 


notified if you need a change in treatment.





ADDITIONAL INSTRUCTIONS AND INFORMATION:





1.  Your care today has been supervised by a physician who is specially trained 

in emergency 


care.  Many problems require more than one evaluation for a complete diagnosis 

and 


treatment.  We recommend that you schedule your follow up appointment as 

recommended to 


ensure complete treatment of you illness or injury.  If you are unable to obtain

 follow up 


care and continue to have a problem, or if your condition worsens, we recommend 

that you 


return to the ED.





2.  We are not able to safely determine your condition over the phone nor are we

 able to 


give sound medical advice over the phone.  For these safety reasons, if you call

 for medical 


advice we will ask you to come to the ED for further evaluation.





3.  If you have any questions regarding these discharge instructions please call

 the ED at 


[phone].





SAFETY INFORMATION:





In the interest of safety, wellness, and injury prevention; we encourage you to 

wear your 


sealbelt, if you smoke; quite smoking, and we encourage family to use a 

protective helmet 


for bicycling and other sporting events that present an increased risk for head 

injury.





IF YOUR SYMPTOMS WORSEN OR NEW SYMPTOMS DEVELOP, OR YOU HAVE CONCERNS ABOUT YOUR

 CONDITION; 


OR IF YOUR CONDITION WORSENS WHILE YOU ARE WAITING FOR YOUR FOLLOW UP 

APPOINTMENT; EITHER 


CONTACT YOUR PRIMARY CARE DOCTOR, THE PHYSICIAN WHOSE NAME AND NUMBER YOU WERE 

GIVEN, OR 


RETURN TO THE ED IMMEDIATELY.


Scripts


Famotidine (PEPCID) 20 Mg Tablet


1 TAB PO BID for abdominal pain for 14 Days, #28 TAB 0 Refills


   Prov: MORENITA FONTENOT DO         21











MORENITA FONTENOT DO               Sep 28, 2021 09:19

## 2021-09-28 NOTE — RAD
CT scan of the abdomen and pelvis with contrast  4/12/2020





CLINICAL HISTORY: Right upper quadrant abdominal pain.



TECHNIQUE: After the intravenous administration of 75 cc of Isovue-370 only, contiguous, 5 mm axial s
ections were obtained through the abdomen and pelvis.



One or more of the following individualized dose reduction techniques were utilized for this study:



1. Automated exposure control.

2. Adjustment of the mA and/or kV according to patient size.

3. Use of iterative reconstruction technique.



FINDINGS: Comparison study is dated 2/24/2021.



Images through the lung bases demonstrate minimal dependent subsegmental atelectasis bilaterally.



The liver parenchyma has a decreased attenuation consistent with fatty infiltration. The spleen, panc
reas, and adrenal glands are within normal limits. Nonobstructing renal calculi are seen bilaterally 
which measure 2 to 4 mm in size. There is no evidence of obstruction of either collecting system. Rou
nded low-attenuation lesions are seen involving both kidneys which measure 3 to 5 mm in size. These l
ikely represent cysts. No further imaging evaluation is recommended.



The abdominal aorta tapers normally. Surgical clips are seen within the gallbladder fossa consistent 
with a cholecystectomy. Air and stool are seen throughout the colon. Mildly dilated fluid and air-dennis
led small bowel loops are seen within the left lower quadrant abdomen without definite evidence of sandrita
wel obstruction. The appendix is well-visualized and is within normal limits. No free fluid or free a
ir is seen within the abdomen. A 4 cm fat-containing umbilical hernia is noted.



Images through pelvis demonstrate the urinary bladder distended with urine. Calcifications are seen w
ithin the pelvis consistent with phleboliths. No adnexal mass is seen. No free fluid is noted. Minima
l S-shaped curvature of the thoracolumbar spine is seen. Degenerative changes are seen involving lowe
r thoracic and throughout the lumbar spine along with both hips.



IMPRESSION: No acute abnormality is seen.



Electronically signed by: Chintan Watt MD (9/28/2021 9:08 AM) [id number]

## 2021-12-04 ENCOUNTER — HOSPITAL ENCOUNTER (EMERGENCY)
Dept: HOSPITAL 63 - ER | Age: 46
Discharge: HOME | End: 2021-12-04
Payer: COMMERCIAL

## 2021-12-04 VITALS — WEIGHT: 226.86 LBS | BODY MASS INDEX: 37.8 KG/M2 | HEIGHT: 65 IN

## 2021-12-04 VITALS — DIASTOLIC BLOOD PRESSURE: 78 MMHG | SYSTOLIC BLOOD PRESSURE: 126 MMHG
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
## 2021-12-04 DIAGNOSIS — M25.551: ICD-10-CM
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
## 2021-12-04 DIAGNOSIS — M54.2: Primary | ICD-10-CM
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
## 2021-12-04 PROCEDURE — 96372 THER/PROPH/DIAG INJ SC/IM: CPT

## 2021-12-04 PROCEDURE — 81025 URINE PREGNANCY TEST: CPT

## 2021-12-04 PROCEDURE — 72040 X-RAY EXAM NECK SPINE 2-3 VW: CPT

## 2021-12-04 PROCEDURE — 73502 X-RAY EXAM HIP UNI 2-3 VIEWS: CPT

## 2021-12-04 PROCEDURE — 99284 EMERGENCY DEPT VISIT MOD MDM: CPT
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
## 2022-01-17 ENCOUNTER — HOSPITAL ENCOUNTER (EMERGENCY)
Dept: HOSPITAL 63 - ER | Age: 47
Discharge: HOME | End: 2022-01-17
Payer: COMMERCIAL

## 2022-01-17 VITALS
DIASTOLIC BLOOD PRESSURE: 61 MMHG | SYSTOLIC BLOOD PRESSURE: 132 MMHG | SYSTOLIC BLOOD PRESSURE: 132 MMHG | DIASTOLIC BLOOD PRESSURE: 61 MMHG | DIASTOLIC BLOOD PRESSURE: 61 MMHG | SYSTOLIC BLOOD PRESSURE: 132 MMHG

## 2022-01-17 VITALS — HEIGHT: 65 IN | WEIGHT: 226.86 LBS | BODY MASS INDEX: 37.8 KG/M2

## 2022-01-17 DIAGNOSIS — Z90.49: ICD-10-CM

## 2022-01-17 DIAGNOSIS — M54.59: Primary | ICD-10-CM

## 2022-01-17 PROCEDURE — 96372 THER/PROPH/DIAG INJ SC/IM: CPT

## 2022-01-17 PROCEDURE — 99284 EMERGENCY DEPT VISIT MOD MDM: CPT
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
## 2022-03-08 ENCOUNTER — HOSPITAL ENCOUNTER (OUTPATIENT)
Dept: HOSPITAL 63 - CT | Age: 47
End: 2022-03-08
Attending: FAMILY MEDICINE
Payer: COMMERCIAL

## 2022-03-08 DIAGNOSIS — N20.0: ICD-10-CM

## 2022-03-08 DIAGNOSIS — Z90.49: ICD-10-CM

## 2022-03-08 DIAGNOSIS — R91.8: Primary | ICD-10-CM

## 2022-03-08 DIAGNOSIS — K76.0: ICD-10-CM

## 2022-03-08 PROCEDURE — 71250 CT THORAX DX C-: CPT

## 2022-03-08 NOTE — RAD
PQRS Compliance Statement:



One or more of the following individualized dose reduction techniques were utilized for this examinat
ion:  

1. Automated exposure control  

2. Adjustment of the mA and/or kV according to patient size  

3. Use of iterative reconstruction technique





CT THORAX WO



Clinical Indication: Reason: LUNG NODULE ON LEFT SIDE / Spl. Instructions:  / History: 



Comparison: CT chest without contrast May 21, 2021.



TECHNIQUE: Helical CT imaging of the chest is performed without IV contrast.



Findings: 

The thyroid is symmetric. There is no adenopathy in the chest. The great vessels are normal caliber. 
The cardiac size is normal, no pericardial effusion.



There is no pleural abnormality. The central airways are patent. There is minimal subpleural parenchy
mal scarring in the posterior right upper lobe and in the anterior right midlung. The 1.2 cm groundgl
ass nodular opacity in the superior segment of the left lower lobe is stable or may be less confluent
. Some of the difference may be due to slice thickness difference.



There is fatty infiltration of the liver. Cholecystectomy. Nonobstructing 3 mm left renal calculus. T
he right kidney is minimally imaged.



Thoracic spine alignment is maintained.



IMPRESSION:

1.  The groundglass nodular opacity in the superior segment of the left lower lobe is stable or sligh
tly smaller. Recommend noncontrast CT chest follow-up in 12 months.

2.  Fatty infiltration of the liver.



Electronically signed by: Markell Gerard MD (3/8/2022 10:38 AM) [id number]

## 2022-05-13 ENCOUNTER — HOSPITAL ENCOUNTER (OUTPATIENT)
Dept: HOSPITAL 63 - MAMMO | Age: 47
End: 2022-05-13
Attending: FAMILY MEDICINE
Payer: COMMERCIAL

## 2022-05-13 DIAGNOSIS — N64.89: Primary | ICD-10-CM

## 2022-05-13 PROCEDURE — 77066 DX MAMMO INCL CAD BI: CPT

## 2022-05-13 PROCEDURE — 76641 ULTRASOUND BREAST COMPLETE: CPT

## 2022-05-13 NOTE — RAD
EXAM: Bilateral diagnostic mammogram; right breast sonogram.



HISTORY: Right breast pain.



TECHNIQUE: Full-field digital craniocaudal and mediolateral oblique views of both breasts are obtaine
d for evaluation. Computer aided detection was applied. Sonographic imaging of the right breast targe
jaky to the site of pain was also performed.



COMPARISON: 5/29/2018



BREAST PARENCHYMAL DENSITY: Level A - Mostly fat.



FINDINGS: There is no new suspicious mass, microcalcification or region of architectural distortion. 
There is a stable small asymmetry within the anterior medial left breast. The nearly 4 year course of
 stability favors benignity.



Sonographic imaging of the right breast demonstrates no suspicious finding.



IMPRESSION: 

1. No suspicious mammographic or sonographic finding.

2. BI-RADS Category 2: Benign finding(s). 



RECOMMENDATION: Annual mammography is recommended.



If your mammogram demonstrates that you have dense breast tissue, which could hide abnormalities, and
 if you have other risk factors for breast cancer that have been identified, you might benefit from s
upplemental screening tests that may be suggested by your ordering physician.  Dense breast tissue, i
n and of itself, is a relatively common condition.  This information is not provided to cause undue c
oncern, but rather to raise your awareness and to promote discussion with your physician regarding th
e presence of other risk factors, in addition to dense breast tissue. A report of your mammography re
sults will be sent to you and your physician.  You should contact your physician if you have any ques
tions or concerns regarding this report.  



Mammography is a sensitive method for finding small breast cancers, but it does not detect them all a
nd is not a substitute for careful clinical examination.  A negative mammogram does not negate a clin
ically suspicious finding and should not result in delay in biopsying a clinically suspicious abnorma
lity.



PQRS compliance statement -  Patient information was entered into a reminder system with a target due
 date for the next mammogram. 



"Our facility is accredited by the [employer] Mammography Program."



Electronically signed by: Neha Garcia MD (5/13/2022 2:54 PM) [id number]

## 2022-05-13 NOTE — RAD
EXAM: Bilateral diagnostic mammogram; right breast sonogram.



HISTORY: Right breast pain.



TECHNIQUE: Full-field digital craniocaudal and mediolateral oblique views of both breasts are obtaine
d for evaluation. Computer aided detection was applied. Sonographic imaging of the right breast targe
jaky to the site of pain was also performed.



COMPARISON: 5/29/2018



BREAST PARENCHYMAL DENSITY: Level A - Mostly fat.



FINDINGS: There is no new suspicious mass, microcalcification or region of architectural distortion. 
There is a stable small asymmetry within the anterior medial left breast. The nearly 4 year course of
 stability favors benignity.



Sonographic imaging of the right breast demonstrates no suspicious finding.



IMPRESSION: 

1. No suspicious mammographic or sonographic finding.

2. BI-RADS Category 2: Benign finding(s). 



RECOMMENDATION: Annual mammography is recommended.



If your mammogram demonstrates that you have dense breast tissue, which could hide abnormalities, and
 if you have other risk factors for breast cancer that have been identified, you might benefit from s
upplemental screening tests that may be suggested by your ordering physician.  Dense breast tissue, i
n and of itself, is a relatively common condition.  This information is not provided to cause undue c
oncern, but rather to raise your awareness and to promote discussion with your physician regarding th
e presence of other risk factors, in addition to dense breast tissue. A report of your mammography re
sults will be sent to you and your physician.  You should contact your physician if you have any ques
tions or concerns regarding this report.  



Mammography is a sensitive method for finding small breast cancers, but it does not detect them all a
nd is not a substitute for careful clinical examination.  A negative mammogram does not negate a clin
ically suspicious finding and should not result in delay in biopsying a clinically suspicious abnorma
lity.



PQRS compliance statement -  Patient information was entered into a reminder system with a target due
 date for the next mammogram. 



"Our facility is accredited by the [employer] Mammography Program."



Electronically signed by: Neha Garcia MD (5/13/2022 2:54 PM) [id number]